# Patient Record
Sex: FEMALE | Race: WHITE | NOT HISPANIC OR LATINO | URBAN - METROPOLITAN AREA
[De-identification: names, ages, dates, MRNs, and addresses within clinical notes are randomized per-mention and may not be internally consistent; named-entity substitution may affect disease eponyms.]

---

## 2017-03-30 ENCOUNTER — INPATIENT (INPATIENT)
Facility: HOSPITAL | Age: 37
LOS: 1 days | Discharge: HOME | End: 2017-04-01
Attending: OBSTETRICS & GYNECOLOGY | Admitting: OBSTETRICS & GYNECOLOGY

## 2017-06-27 DIAGNOSIS — O09.43 SUPERVISION OF PREGNANCY WITH GRAND MULTIPARITY, THIRD TRIMESTER: ICD-10-CM

## 2017-06-27 DIAGNOSIS — Z3A.38 38 WEEKS GESTATION OF PREGNANCY: ICD-10-CM

## 2017-06-27 DIAGNOSIS — Z33.1 PREGNANT STATE, INCIDENTAL: ICD-10-CM

## 2019-02-04 ENCOUNTER — INPATIENT (INPATIENT)
Facility: HOSPITAL | Age: 39
LOS: 0 days | Discharge: HOME | End: 2019-02-05
Attending: OBSTETRICS & GYNECOLOGY | Admitting: OBSTETRICS & GYNECOLOGY
Payer: MEDICAID

## 2019-02-04 VITALS
OXYGEN SATURATION: 99 % | SYSTOLIC BLOOD PRESSURE: 120 MMHG | RESPIRATION RATE: 18 BRPM | DIASTOLIC BLOOD PRESSURE: 60 MMHG | TEMPERATURE: 98 F | HEART RATE: 125 BPM

## 2019-02-04 LAB
ALBUMIN SERPL ELPH-MCNC: 3.7 G/DL — SIGNIFICANT CHANGE UP (ref 3.5–5.2)
ALP SERPL-CCNC: 93 U/L — SIGNIFICANT CHANGE UP (ref 30–115)
ALT FLD-CCNC: 15 U/L — SIGNIFICANT CHANGE UP (ref 0–41)
ANION GAP SERPL CALC-SCNC: 21 MMOL/L — HIGH (ref 7–14)
APPEARANCE UR: ABNORMAL
AST SERPL-CCNC: 24 U/L — SIGNIFICANT CHANGE UP (ref 0–41)
BASE EXCESS BLDV CALC-SCNC: -1.7 MMOL/L — SIGNIFICANT CHANGE UP (ref -2–2)
BASOPHILS # BLD AUTO: 0.01 K/UL — SIGNIFICANT CHANGE UP (ref 0–0.2)
BASOPHILS NFR BLD AUTO: 0.1 % — SIGNIFICANT CHANGE UP (ref 0–1)
BILIRUB DIRECT SERPL-MCNC: <0.2 MG/DL — SIGNIFICANT CHANGE UP (ref 0–0.2)
BILIRUB INDIRECT FLD-MCNC: >0.1 MG/DL — LOW (ref 0.2–1.2)
BILIRUB SERPL-MCNC: 0.3 MG/DL — SIGNIFICANT CHANGE UP (ref 0.2–1.2)
BILIRUB UR-MCNC: NEGATIVE — SIGNIFICANT CHANGE UP
BUN SERPL-MCNC: 4 MG/DL — LOW (ref 10–20)
CA-I SERPL-SCNC: 1.27 MMOL/L — SIGNIFICANT CHANGE UP (ref 1.12–1.3)
CALCIUM SERPL-MCNC: 10 MG/DL — SIGNIFICANT CHANGE UP (ref 8.5–10.1)
CHLORIDE SERPL-SCNC: 98 MMOL/L — SIGNIFICANT CHANGE UP (ref 98–110)
CO2 SERPL-SCNC: 21 MMOL/L — SIGNIFICANT CHANGE UP (ref 17–32)
COLOR SPEC: YELLOW — SIGNIFICANT CHANGE UP
CREAT SERPL-MCNC: <0.5 MG/DL — LOW (ref 0.7–1.5)
DIFF PNL FLD: NEGATIVE — SIGNIFICANT CHANGE UP
EOSINOPHIL # BLD AUTO: 0.01 K/UL — SIGNIFICANT CHANGE UP (ref 0–0.7)
EOSINOPHIL NFR BLD AUTO: 0.1 % — SIGNIFICANT CHANGE UP (ref 0–8)
EPI CELLS # UR: ABNORMAL /HPF
FLU A RESULT: POSITIVE
FLU A RESULT: POSITIVE
FLUAV AG NPH QL: POSITIVE
FLUBV AG NPH QL: NEGATIVE — SIGNIFICANT CHANGE UP
GAS PNL BLDV: 138 MMOL/L — SIGNIFICANT CHANGE UP (ref 136–145)
GAS PNL BLDV: SIGNIFICANT CHANGE UP
GLUCOSE SERPL-MCNC: 80 MG/DL — SIGNIFICANT CHANGE UP (ref 70–99)
GLUCOSE UR QL: NEGATIVE MG/DL — SIGNIFICANT CHANGE UP
HCO3 BLDV-SCNC: 23 MMOL/L — SIGNIFICANT CHANGE UP (ref 22–29)
HCT VFR BLD CALC: 36.5 % — LOW (ref 37–47)
HCT VFR BLDA CALC: 39.8 % — SIGNIFICANT CHANGE UP (ref 34–44)
HGB BLD CALC-MCNC: 13 G/DL — LOW (ref 14–18)
HGB BLD-MCNC: 12 G/DL — SIGNIFICANT CHANGE UP (ref 12–16)
IMM GRANULOCYTES NFR BLD AUTO: 1.4 % — HIGH (ref 0.1–0.3)
KETONES UR-MCNC: 40
LACTATE BLDV-MCNC: 2.4 MMOL/L — HIGH (ref 0.5–1.6)
LEUKOCYTE ESTERASE UR-ACNC: ABNORMAL
LIDOCAIN IGE QN: 21 U/L — SIGNIFICANT CHANGE UP (ref 7–60)
LYMPHOCYTES # BLD AUTO: 0.43 K/UL — LOW (ref 1.2–3.4)
LYMPHOCYTES # BLD AUTO: 4.9 % — LOW (ref 20.5–51.1)
MCHC RBC-ENTMCNC: 28.4 PG — SIGNIFICANT CHANGE UP (ref 27–31)
MCHC RBC-ENTMCNC: 32.9 G/DL — SIGNIFICANT CHANGE UP (ref 32–37)
MCV RBC AUTO: 86.5 FL — SIGNIFICANT CHANGE UP (ref 81–99)
MONOCYTES # BLD AUTO: 0.88 K/UL — HIGH (ref 0.1–0.6)
MONOCYTES NFR BLD AUTO: 10 % — HIGH (ref 1.7–9.3)
NEUTROPHILS # BLD AUTO: 7.38 K/UL — HIGH (ref 1.4–6.5)
NEUTROPHILS NFR BLD AUTO: 83.5 % — HIGH (ref 42.2–75.2)
NITRITE UR-MCNC: NEGATIVE — SIGNIFICANT CHANGE UP
NRBC # BLD: 0 /100 WBCS — SIGNIFICANT CHANGE UP (ref 0–0)
PCO2 BLDV: 40 MMHG — LOW (ref 41–51)
PH BLDV: 7.37 — SIGNIFICANT CHANGE UP (ref 7.26–7.43)
PH UR: 6.5 — SIGNIFICANT CHANGE UP (ref 5–8)
PLATELET # BLD AUTO: 193 K/UL — SIGNIFICANT CHANGE UP (ref 130–400)
PO2 BLDV: 29 MMHG — SIGNIFICANT CHANGE UP (ref 20–40)
POTASSIUM BLDV-SCNC: 3.5 MMOL/L — SIGNIFICANT CHANGE UP (ref 3.3–5.6)
POTASSIUM SERPL-MCNC: 3.7 MMOL/L — SIGNIFICANT CHANGE UP (ref 3.5–5)
POTASSIUM SERPL-SCNC: 3.7 MMOL/L — SIGNIFICANT CHANGE UP (ref 3.5–5)
PROT SERPL-MCNC: 6.4 G/DL — SIGNIFICANT CHANGE UP (ref 6–8)
PROT UR-MCNC: NEGATIVE MG/DL — SIGNIFICANT CHANGE UP
RBC # BLD: 4.22 M/UL — SIGNIFICANT CHANGE UP (ref 4.2–5.4)
RBC # FLD: 14 % — SIGNIFICANT CHANGE UP (ref 11.5–14.5)
RSV RESULT: NEGATIVE — SIGNIFICANT CHANGE UP
RSV RNA RESP QL NAA+PROBE: NEGATIVE — SIGNIFICANT CHANGE UP
SAO2 % BLDV: 49 % — SIGNIFICANT CHANGE UP
SODIUM SERPL-SCNC: 140 MMOL/L — SIGNIFICANT CHANGE UP (ref 135–146)
SP GR SPEC: <=1.005 — SIGNIFICANT CHANGE UP (ref 1.01–1.03)
TROPONIN T SERPL-MCNC: <0.01 NG/ML — SIGNIFICANT CHANGE UP
UROBILINOGEN FLD QL: 0.2 MG/DL — SIGNIFICANT CHANGE UP (ref 0.2–0.2)
WBC # BLD: 8.83 K/UL — SIGNIFICANT CHANGE UP (ref 4.8–10.8)
WBC # FLD AUTO: 8.83 K/UL — SIGNIFICANT CHANGE UP (ref 4.8–10.8)
WBC UR QL: ABNORMAL /HPF

## 2019-02-04 RX ORDER — SODIUM CHLORIDE 9 MG/ML
1000 INJECTION INTRAMUSCULAR; INTRAVENOUS; SUBCUTANEOUS ONCE
Qty: 0 | Refills: 0 | Status: COMPLETED | OUTPATIENT
Start: 2019-02-04 | End: 2019-02-04

## 2019-02-04 RX ORDER — ACETAMINOPHEN 500 MG
650 TABLET ORAL ONCE
Qty: 0 | Refills: 0 | Status: COMPLETED | OUTPATIENT
Start: 2019-02-04 | End: 2019-02-04

## 2019-02-04 RX ORDER — SODIUM CHLORIDE 9 MG/ML
1000 INJECTION, SOLUTION INTRAVENOUS ONCE
Qty: 0 | Refills: 0 | Status: COMPLETED | OUTPATIENT
Start: 2019-02-04 | End: 2019-02-04

## 2019-02-04 RX ADMIN — SODIUM CHLORIDE 2000 MILLILITER(S): 9 INJECTION INTRAMUSCULAR; INTRAVENOUS; SUBCUTANEOUS at 20:20

## 2019-02-04 RX ADMIN — SODIUM CHLORIDE 1000 MILLILITER(S): 9 INJECTION, SOLUTION INTRAVENOUS at 21:45

## 2019-02-04 RX ADMIN — Medication 650 MILLIGRAM(S): at 21:45

## 2019-02-04 RX ADMIN — Medication 75 MILLIGRAM(S): at 22:11

## 2019-02-04 RX ADMIN — SODIUM CHLORIDE 2000 MILLILITER(S): 9 INJECTION INTRAMUSCULAR; INTRAVENOUS; SUBCUTANEOUS at 23:20

## 2019-02-04 NOTE — ED ADULT NURSE NOTE - OBJECTIVE STATEMENT
Patient complaining of weakness and near syncopal episodes. Patient denies any loc. Denies N/V/D or cough at this time. Patient is 34 weeks pregnant with no PMH.

## 2019-02-04 NOTE — OB PROVIDER H&P - NSHPLABSRESULTS_GEN_ALL_CORE
8.83>12.0/36.5<193, 140/3.7/98/21/4/0.5<133, AST/ALT 24/15, lipase 21, blood gas values wnl except blood gas venous lactate 2.4, total hemoglobin 13.0, pCO2 venous 40, UA 40 ketone, moderate leuk esterase, 10-25 WBCs, few epithelial cells, Flu A pos, Flu B and RSV neg    EKG FINDINGS:    Ventricular Rate 136 BPM  Atrial Rate 136 BPM  P-R Interval 130 ms  QRS Duration 66 ms  Q-T Interval 290 ms  QTC Calculation(Bezet) 436 ms  P Axis 69 degrees  R Axis 62 degrees  T Axis 35 degrees  Diagnosis: Line Sinus tachycardia  Possible Left atrial enlargement  Nonspecific T wave abnormality  Abnormal ECG

## 2019-02-04 NOTE — OB PROVIDER H&P - NSHPPHYSICALEXAM_GEN_ALL_CORE
Vital Signs Last 24 Hrs  T(C): 38.2 (04 Feb 2019 21:15), Max: 38.2 (04 Feb 2019 21:15)  T(F): 100.8 (04 Feb 2019 21:15), Max: 100.8 (04 Feb 2019 21:15)  HR: 134 (04 Feb 2019 21:15) (104 - 134)  BP: 113/63 (04 Feb 2019 21:15) (113/59 - 120/60)  RR: 18 (04 Feb 2019 21:15) (18 - 18)  SpO2: 99% (04 Feb 2019 21:15) (99% - 99%)    Gen: AAOx3  Hearts: regular rhythm, tachycardic with manual HR count of 138 bpm, no murmurs or extra heart sounds  Lungs: clear to auscultation  Abd: NT, gravid, no palpable contractions Vital Signs Last 24 Hrs  T(C): 38.2 (04 Feb 2019 21:15), Max: 38.2 (04 Feb 2019 21:15)  T(F): 100.8 (04 Feb 2019 21:15), Max: 100.8 (04 Feb 2019 21:15)  HR: 134 (04 Feb 2019 21:15) (104 - 134)  BP: 113/63 (04 Feb 2019 21:15) (113/59 - 120/60)  RR: 18 (04 Feb 2019 21:15) (18 - 18)  SpO2: 99% (04 Feb 2019 21:15) (99% - 99%)    Gen: AAOx3  Hearts: regular rhythm, tachycardic with manual HR count of 138 bpm, no murmurs or extra heart sounds  Lungs: clear to auscultation  Abd: NT, gravid, no palpable contractions  Bedside sono: fundal placenta, cephalic, MVP 4.80 cm, BPP 8/8,  bpm

## 2019-02-04 NOTE — OB PROVIDER H&P - NS_OBGYNHISTORY_OBGYN_ALL_OB_FT
OB Hx: FT  x6, no complications  GYN Hx: denies h/o fibroids, abnormal paps, ovarian cysts and STIs OB Hx: FT  x6, largest 8-5lb, no complications  GYN Hx: denies h/o fibroids, abnormal paps, ovarian cysts and STIs

## 2019-02-04 NOTE — ED PROVIDER NOTE - MEDICAL DECISION MAKING DETAILS
Pt reported febrile when pt took temperature in room. Flu a positive. Remains tachycardic after 1L IVF. Julian continue to bolus and repeat lactate. Give tylenol for fever. Discussed with OBGYN and plan for admission to L+D and tamiflu. Pt voiced understanding and agrees with plan.

## 2019-02-04 NOTE — ED PROVIDER NOTE - PHYSICAL EXAMINATION
CONSTITUTIONAL: well developed, nontoxic appearing, in no acute distress, speaking in full sentences  SKIN: warm, dry, no rash, cap refill < 2 seconds  HEENT: normocephalic, atraumatic, no conjunctival erythema, moist mucous membranes, patent airway  NECK: supple, no masses  CV:  tachycardic rate, regular rhythm, 2+ radial pulses bilaterally  RESP: no wheezes, no rales, no rhonchi, normal work of breathing  BACK: no CVA tenderness  ABD: soft, nontender, nondistended, no rebound, no guarding, normoactive bowel sounds, gravid uterus  MSK: normal ROM, no cyanosis, no edema  NEURO: alert, oriented, grossly unremarkable  PSYCH: cooperative, appropriate

## 2019-02-04 NOTE — OB PROVIDER H&P - HISTORY OF PRESENT ILLNESS
40 yo  at 33w5d w/ JAMES of 3/20/2019 by here for fever/chills and dizziness/fainting spells associated with SOB and palpitations that started when she woke up at 0900. Pt reported not even feeling able to get out of her bed by 1500, and coming to the ER with an ambulance. Pt reports her daughter having the flu recently but just getting better. Also reports having "a couple drops of diarrhea" every time she goes to urinate. No other separate episodes, nonbloody. Pt additionally reports having heartburn-like pain after eating, but cannot be sure if that is what it is. She reports that she is having burning pain at her epigastric area since she ate last. Denies N/V, dysuria, frequency and urgency, abdominal vaginal discharge and recent travel. Denies contractions, LOF and vaginal bleeding. Feels good FM. No complications in this pregnancy. Last PMD visit was 38 yo  at 33w5d w/ JAMES of 3/20/2019 by 1st trimester sonogram here for fever/chills (100.5 F) and dizziness/fainting spells associated with SOB and palpitations that started when she woke up at 0900. Pt reports not even feeling able to get out of her bed by 1500, and coming to the ER with an ambulance. Pt reports her daughter having the flu recently but just recently getting better. Also reports having "a couple drops of diarrhea" every time she goes to urinate. No other separate episodes, nonbloody. Pt additionally reports having heartburn pain after eating. She reports having similar pain in previous pregnancies and postpartum periods, and also during this pregnancy before. Pt seems mostly concerned about the dizziness/fainting spells as these episodes also happened two months ago for a week long period. She was evaluated by her OB but further referred to her primary care doctor. Pt never went to see her primary care doctor as the episodes subsided. Denies N/V, dysuria, frequency and urgency, abdominal vaginal discharge and recent travel. Denies contractions, LOF and vaginal bleeding. Feels good FM. Last BM was a small amount of diarrhea 15 minutes ago, last PO intake was at 2100, last intercourse was a week and a half ago. No complications in this pregnancy. Last PMD visit was around a month ago and pt never had a VE.

## 2019-02-04 NOTE — ED PROVIDER NOTE - PROGRESS NOTE DETAILS
Sepsis suspected at this time. Sepsis suspected at this time. ABX held for viral illness Spoke with OBGYN who will see pt. Call placed for Dr. Harper. Spoke with Dr. Ellison covering for Dr. Harper. Recommended admission and tamiflu.  pregnant 34w presenting with flu-like sx. Sepsis suspected at this time. ABX held for viral illness. Bedside US shows . Cardiac workup neg, do not suspect PE at this time. Influenza A positive. UA only with +LE and 15-20 WBC but no nitrite, abx not indicated at this time. Spoke with OBGYN who will see pt. Call placed for Dr. Harper. Pt requesting to hold off on tamiflu until cleared by Dr. Harper. Spoke with Dr. Ellison covering for Dr. Harper. Recommended admission and tamiflu. Updated pt and  at bedside. Pt amenable to tamiflu.

## 2019-02-04 NOTE — ED PROVIDER NOTE - NS ED ROS FT
GEN:  + fever, + chills, + fatigue  NEURO:  no headache, + dizziness  ENT: no sore throat, no runny nose  CV:  + chest pain, no SOB, + palpitations  RESP:  no dyspnea, no cough  GI:  no nausea, no vomiting, no abdominal pain, + diarrhea, no constipation  :  no dysuria, no urinary frequency, no hematuria  MSK:  no joint pain, no edema  SKIN:  no rash, no bruising  HEME: no hematochezia, no melena

## 2019-02-04 NOTE — ED PROVIDER NOTE - OBJECTIVE STATEMENT
38 yo F  34 wks by US who presents with fever Tmax 100.5 and near-syncopal events since this morning. Pt felt well going to bed last night but this morning woke up with generalized weakness, lightheadedness feeling like she's about to pass out, palpitations. Also had intermittent nonradiating midsternal cp that worsened after eating, felt similar to prior heartburn she's had in the past, pain not worse with deep breaths. Had small amounts of nonbloody diarrhea. No nausea, vomiting, abd pain/cramping, cough, vaginal bleeding/dc, gush of fluids, leg swelling/pain. +fetal movement. Had UTI 2 wks ago that was tx'ed with bactrim, currently denies any urinary sx. Child at home tested + for flu. Pt did not receive flu shot this year. No hx of complications during pregnancy.    OBGYN: Dr. Harper

## 2019-02-04 NOTE — ED PROVIDER NOTE - ATTENDING CONTRIBUTION TO CARE
39F presentingw ith 1 day of near syncope which is followed by palpiations, nausea and 39F  34 wks by US who with 1 day of near syncope which is followed by palpitations, nausea. Endorses fever and sick contact (daughter with flu). Has recent UTI which resolved with ABX. No back pain, vomiting, vaginal bleeding, abdominal pain or cramping. No vaginal discharge.   VITAL SIGNS: noted  CONSTITUTIONAL: Well-developed; well-nourished; ill appearing. non toxic  HEAD: Normocephalic; atraumatic  EYES: conjunctiva and sclera clear  ENT: No nasal discharge; airway clear. MMM  NECK: Supple; non tender. No anterior cervical lymphadenopathy noted  CARD: regular, tachycardic  RESP: CTAB/L, no wheezes, rales or rhonchi  ABD: Normal bowel sounds; firm, appropriately distended, nt. no cvat bl  EXT: Normal ROM. No calf tenderness or edema. Distal pulses intact  NEURO: Alert, oriented. Grossly unremarkable. No focal deficits  SKIN: Skin exam is warm and dry, no acute rash  MS: No midline spinal tenderness    Fever and near syncope concern for infection initially sinus tachycardic to 135 on ekg. WIll treat as sepsis, abx deferred for likley viral syndrome.

## 2019-02-05 ENCOUNTER — TRANSCRIPTION ENCOUNTER (OUTPATIENT)
Age: 39
End: 2019-02-05

## 2019-02-05 VITALS
RESPIRATION RATE: 18 BRPM | HEART RATE: 90 BPM | DIASTOLIC BLOOD PRESSURE: 49 MMHG | OXYGEN SATURATION: 100 % | SYSTOLIC BLOOD PRESSURE: 94 MMHG

## 2019-02-05 LAB
ANION GAP SERPL CALC-SCNC: 15 MMOL/L — HIGH (ref 7–14)
BUN SERPL-MCNC: 3 MG/DL — LOW (ref 10–20)
CALCIUM SERPL-MCNC: 8.1 MG/DL — LOW (ref 8.5–10.1)
CHLORIDE SERPL-SCNC: 103 MMOL/L — SIGNIFICANT CHANGE UP (ref 98–110)
CO2 SERPL-SCNC: 22 MMOL/L — SIGNIFICANT CHANGE UP (ref 17–32)
CREAT SERPL-MCNC: <0.5 MG/DL — LOW (ref 0.7–1.5)
CULTURE RESULTS: SIGNIFICANT CHANGE UP
GLUCOSE SERPL-MCNC: 96 MG/DL — SIGNIFICANT CHANGE UP (ref 70–99)
HCT VFR BLD CALC: 29.9 % — LOW (ref 37–47)
HGB BLD-MCNC: 9.6 G/DL — LOW (ref 12–16)
MCHC RBC-ENTMCNC: 28.2 PG — SIGNIFICANT CHANGE UP (ref 27–31)
MCHC RBC-ENTMCNC: 32.1 G/DL — SIGNIFICANT CHANGE UP (ref 32–37)
MCV RBC AUTO: 87.9 FL — SIGNIFICANT CHANGE UP (ref 81–99)
NRBC # BLD: 0 /100 WBCS — SIGNIFICANT CHANGE UP (ref 0–0)
PLATELET # BLD AUTO: 160 K/UL — SIGNIFICANT CHANGE UP (ref 130–400)
POTASSIUM SERPL-MCNC: 3.8 MMOL/L — SIGNIFICANT CHANGE UP (ref 3.5–5)
POTASSIUM SERPL-SCNC: 3.8 MMOL/L — SIGNIFICANT CHANGE UP (ref 3.5–5)
RBC # BLD: 3.4 M/UL — LOW (ref 4.2–5.4)
RBC # FLD: 14 % — SIGNIFICANT CHANGE UP (ref 11.5–14.5)
SODIUM SERPL-SCNC: 140 MMOL/L — SIGNIFICANT CHANGE UP (ref 135–146)
SPECIMEN SOURCE: SIGNIFICANT CHANGE UP
WBC # BLD: 5.84 K/UL — SIGNIFICANT CHANGE UP (ref 4.8–10.8)
WBC # FLD AUTO: 5.84 K/UL — SIGNIFICANT CHANGE UP (ref 4.8–10.8)

## 2019-02-05 PROCEDURE — 99282 EMERGENCY DEPT VISIT SF MDM: CPT | Mod: 25

## 2019-02-05 PROCEDURE — 93970 EXTREMITY STUDY: CPT | Mod: 26

## 2019-02-05 PROCEDURE — 59025 FETAL NON-STRESS TEST: CPT | Mod: 26

## 2019-02-05 RX ORDER — ACETAMINOPHEN 500 MG
2 TABLET ORAL
Qty: 112 | Refills: 0 | OUTPATIENT
Start: 2019-02-05 | End: 2019-02-18

## 2019-02-05 RX ORDER — ACETAMINOPHEN 500 MG
650 TABLET ORAL EVERY 6 HOURS
Qty: 0 | Refills: 0 | Status: DISCONTINUED | OUTPATIENT
Start: 2019-02-05 | End: 2019-02-05

## 2019-02-05 RX ORDER — IPRATROPIUM/ALBUTEROL SULFATE 18-103MCG
3 AEROSOL WITH ADAPTER (GRAM) INHALATION EVERY 4 HOURS
Qty: 0 | Refills: 0 | Status: DISCONTINUED | OUTPATIENT
Start: 2019-02-05 | End: 2019-02-05

## 2019-02-05 RX ORDER — SODIUM CHLORIDE 9 MG/ML
1000 INJECTION, SOLUTION INTRAVENOUS
Qty: 0 | Refills: 0 | Status: DISCONTINUED | OUTPATIENT
Start: 2019-02-05 | End: 2019-02-05

## 2019-02-05 RX ADMIN — SODIUM CHLORIDE 125 MILLILITER(S): 9 INJECTION, SOLUTION INTRAVENOUS at 03:17

## 2019-02-05 RX ADMIN — Medication 75 MILLIGRAM(S): at 18:34

## 2019-02-05 RX ADMIN — SODIUM CHLORIDE 125 MILLILITER(S): 9 INJECTION, SOLUTION INTRAVENOUS at 10:36

## 2019-02-05 RX ADMIN — Medication 75 MILLIGRAM(S): at 05:46

## 2019-02-05 RX ADMIN — Medication 650 MILLIGRAM(S): at 08:52

## 2019-02-05 NOTE — DISCHARGE NOTE OB - PATIENT PORTAL LINK FT
You can access the [facility] Patient Portal, offered by [facility], by registering with the following website: [email or web address]

## 2019-02-05 NOTE — CONSULT NOTE ADULT - CONSULT REASON
40 y/o now 33 weeks pregnant with influenza. H/o "fainting spells" 6 weeks ago every other day. Today fainting spells came back, associated with palpitations, sweating, head feeling heavy.

## 2019-02-05 NOTE — PROGRESS NOTE ADULT - SUBJECTIVE AND OBJECTIVE BOX
PGY 4 Note    Pt evaluated at bedside s/p cardiology consultation (documented normal LV function, positive flu, sinus tachy) can be discharged home. Patient denies CP, SOB, nausea, vomiting, diarrhea constipation. Reports flu like symptoms have significantly improved since admission. Denies palpitations or dizzy spells at this time. Reports good fetal movement; denies VB, LOF or contractions. Per PMD, can be discharged home    T(F): 97.3 (20:31)  HR: 93 (20:31)  BP: 97/53 (20:31)  RR: 18 (20:31)    Labs:                        9.6    5.84  )-----------( 160      ( 05 Feb 2019 07:34 )             29.9       02-05    140  |  103  |  3<L>  ----------------------------<  96  3.8   |  22  |  <0.5<L>    Ca    8.1<L>      05 Feb 2019 07:34    TPro  6.4  /  Alb  3.7  /  TBili  0.3  /  DBili  <0.2  /  AST  24  /  ALT  15  /  AlkPhos  93  02-04      Urinalysis Basic - ( 04 Feb 2019 20:00 )    Color: Yellow / Appearance: Cloudy / SG: <=1.005 / pH: x  Gluc: x / Ketone: 40  / Bili: Negative / Urobili: 0.2 mg/dL   Blood: x / Protein: Negative mg/dL / Nitrite: Negative   Leuk Esterase: Moderate / RBC: x / WBC 10-25 /HPF   Sq Epi: x / Non Sq Epi: Few /HPF / Bacteria: x      Meds:   (Floorstock) 2 each &lt;see task&gt; GivenOnce  acetaminophen   Tablet .. 650 milliGRAM(s) Oral once  lactated ringers Bolus 1000 milliLiter(s) IV Bolus once  sodium chloride 0.9% Bolus 1000 milliLiter(s) IV Bolus once  sodium chloride 0.9% Bolus 1000 milliLiter(s) IV Bolus once

## 2019-02-05 NOTE — DISCHARGE NOTE OB - CARE PROVIDER_API CALL
Brett Harper)  Obstetrics and Gynecology  [address] [place] [address]  Phone: [phone]  Fax: [phone]  Follow Up Time:

## 2019-02-05 NOTE — CONSULT NOTE ADULT - ASSESSMENT
40 yo  at 33w6d w/ JAMES of 3/20/2019 by 1st trimester sonogram, Flu A positive, with complaints of pre-syncope r/o cardiac etiology    - Admitted to [facility] floor  - IV fluids  - NST BID  - F/u Cardiology consult  - F/u TTE (not done yet)  - Repeat AM labs (CBC, BMP)  - F/u BCx, UCx  - Consider orthostatic BPs  - Consider LE duplex  - F/u Temps  - Tylenol PRN  - Regular diet  - Ambulate as tolerated  - SCDs while in bed  - Cont tamiflu, nebs PRN    Will follow    Dr. Jara to be made aware

## 2019-02-05 NOTE — PROGRESS NOTE ADULT - SUBJECTIVE AND OBJECTIVE BOX
Attending  Antepartum Note    Patient seen at bedside. Denies ctx, vb, lof. Good FM. No complaints.  No further syncopal episodes, no sob, no chest pain.     T(F): 98.3 (12:45)  HR: 105 (12:45)  BP: 93/50 (12:45)  RR: 18 (12:45)  BP range:  Gen: NAD, A&Ox3  Abd: Gravid, soft, NT, no palpable ctx  VE: Deferred  Ext: No edema or calf tenderness    Tracing: in progress moderate variability , Category 1     Medications:  (Floorstock): 2 Each (02-04-19 @ 21:53)  acetaminophen   Tablet ..: 650 milliGRAM(s) (02-04-19 @ 21:45)  lactated ringers Bolus: 1000 mL/Hr (02-04-19 @ 21:45)  lactated ringers.: 125 mL/Hr (02-05-19 @ 03:17),  125 mL/Hr (02-05-19 @ 00:54)  oseltamivir: 75 milliGRAM(s) (02-05-19 @ 05:46),  75 milliGRAM(s) (02-04-19 @ 22:11)  sodium chloride 0.9% Bolus: 2000 mL/Hr (02-04-19 @ 20:20)  sodium chloride 0.9% Bolus: 2000 mL/Hr (02-04-19 @ 23:20)      Labs:                        9.6    5.84  )-----------( 160      ( 05 Feb 2019 07:34 )             29.9     02-05    140  |  103  |  3<L>  ----------------------------<  96  3.8   |  22  |  <0.5<L>    Ca    8.1<L>      05 Feb 2019 07:34    TPro  6.4  /  Alb  3.7  /  TBili  0.3  /  DBili  <0.2  /  AST  24  /  ALT  15  /  AlkPhos  93  02-04      Urinalysis Basic - ( 04 Feb 2019 20:00 )    Color: Yellow / Appearance: Cloudy / SG: <=1.005 / pH: x  Gluc: x / Ketone: 40  / Bili: Negative / Urobili: 0.2 mg/dL   Blood: x / Protein: Negative mg/dL / Nitrite: Negative   Leuk Esterase: Moderate / RBC: x / WBC 10-25 /HPF   Sq Epi: x / Non Sq Epi: Few /HPF / Bacteria: x    Chest X ray  Cardiac/mediastinum/hilum: Unremarkable.  Lung parenchyma/Pleura: Left basilar linear atelectasis. No defined   consolidation or pneumothorax..  Skeleton/soft tissues: Unremarkable.  Impression:    Left basilar linear atelectasis    Transthoracic Echocardiogram   Summary:   1. Mildly enlarged left atrium.   2. Mild tricuspid regurgitation.   3. Pulmonic valve regurgitation.   4. Estimated pulmonary artery systolic pressure is 37.3 mmHg assuming a   right atrial pressure of 15 mmHg, which is consistent with borderline   pulmonary hypertension.   5. LA volume Index is 34.3 ml/m² ml/m2.     12 Lead ECG   Diagnosis Line Sinus tachycardia  Possible Left atrial enlargement  Nonspecific T wave abnormality          Venous Duplex pending    A/P:   39y at 33wk 5d with + flu A, admitted w/ dehydration and presyncopal episode  and tachycardia and non specific ekg findings. , in midst of cardiac work-up.   -Continue Tamiflu 75 Bid   -will lock iv as per pateint requst as long as po fluids tolerated  -Nst bid  -await cardiology consult  -f/u dupplex doppler results   -check pending labs

## 2019-02-05 NOTE — DISCHARGE NOTE OB - HOSPITAL COURSE
DATE OF DISCHARGE: 19 @ 21:49    HISTORY OF PRESENT ILLNESS/HOSPITAL COURSE: HPI:  38 yo  at 33w5d w/ JAMES of 3/20/2019 by 1st trimester sonogram here for fever/chills (100.5 F) and dizziness/fainting spells associated with SOB and palpitations that started when she woke up at 0900. Pt reports not even feeling able to get out of her bed by 1500, and coming to the ER with an ambulance. Pt reports her daughter having the flu recently but just recently getting better. Also reports having "a couple drops of diarrhea" every time she goes to urinate. No other separate episodes, nonbloody. Pt additionally reports having heartburn pain after eating. She reports having similar pain in previous pregnancies and postpartum periods, and also during this pregnancy before. Pt seems mostly concerned about the dizziness/fainting spells as these episodes also happened two months ago for a week long period. She was evaluated by her OB but further referred to her primary care doctor. Pt never went to see her primary care doctor as the episodes subsided. Denies N/V, dysuria, frequency and urgency, abdominal vaginal discharge and recent travel. Denies contractions, LOF and vaginal bleeding. Feels good FM. Last BM was a small amount of diarrhea 15 minutes ago, last PO intake was at 2100, last intercourse was a week and a half ago. No complications in this pregnancy. Last PMD visit was around a month ago and pt never had a VE. (2019 23:39)    PAST MEDICAL & SURGICAL HISTORY:  No pertinent past medical history  No significant past surgical history      ANTEPARTUM COURSE: patient was admitted in house fo symptomatic management. She was started on tamiflu and tylenol as well as IV hydration. She underwent an ECHO that was read and confirmed by cardio to be unremarkable. She was discharged home for follow up in the antepartum state.

## 2019-02-05 NOTE — CONSULT NOTE ADULT - SUBJECTIVE AND OBJECTIVE BOX
Patient is a 39y old  Female who presents with a chief complaint of "fainting spells"    HPI:  38 yo  at 33w6d w/ JAMES of 3/20/2019 by 1st trimester sonogram here for fever/chills (100.5 F) and dizziness/"fainting spells" associated with SOB, CP and palpitations that started when she woke up yesterday at 0900. The dizzy "fainting spells" feels like her heart is pounding, with associated blackened vision, CP and SOB. Does not lose consciousness and has not fallen. No auditory symptoms or headaches. Pt reports not even feeling able to get out of her bed by 1500, and coming to the ER by ambulance. Pt reports her daughter having the flu recently but just recently getting better. Also reports having small amounts of watery nonbloody stool every time she goes to urinate, last at 0400 this morning. This also happened two months ago for a week long period. She was evaluated by her OB but further referred to her primary care doctor who she never went to see as the episodes resolved. Also has chills, body aches. Denies N/V, dysuria, frequency and urgency, abnormal vaginal discharge or recent travel. Denies contractions, LOF and vaginal bleeding. Feels good FM. Last PO intake was at midnight, last intercourse was a week and a half ago. No complications in this pregnancy.     OB: FT  x 6, largest baby >8lb, uncomplicated  GYN: Denies abnormal paps, fibroids, cysts, STIs    PAST MEDICAL & SURGICAL HISTORY:  No pertinent past medical history  No significant past surgical history      MEDICATIONS  (STANDING):  lactated ringers. 1000 milliLiter(s) (125 mL/Hr) IV Continuous <Continuous>  oseltamivir 75 milliGRAM(s) Oral two times a day      FAMILY HISTORY:  No pertinent family history in first degree relatives      Allergies    No Known Allergies    Intolerances        REVIEW OF SYSTEMS    Neg except for in HPI    Vital Signs Last 24 Hrs  T(C): 37.1 (2019 05:46), Max: 38.2 (2019 21:15)  T(F): 98.7 (2019 05:46), Max: 100.8 (2019 21:15)  HR: 109 (2019 05:46) (104 - 134)  BP: 100/52 (2019 05:46) (100/52 - 120/60)  RR: 18 (2019 05:46) (18 - 18)  SpO2: 100% (2019 05:46) (99% - 100%) on RA    PHYSICAL EXAM:    Gen: NAD, A&Ox3  Heart: S1S2, RRR  Lungs: CTAB  Abd: Gravid, soft, NT, no palpable ctx  SVE: Deferred  Ext: No edema or calf tenderness      EFM: /mod/+accels  [place]: Q m        LABORATORY:                        12.0   8.83  )-----------( 193      ( 2019 19:24 )             36.5     02-    140  |  98  |  4<L>  ----------------------------<  80  3.7   |  21  |  <0.5<L>    Ca    10.0      2019 19:24    TPro  6.4  /  Alb  3.7  /  TBili  0.3  /  DBili  <0.2  /  AST  24  /  ALT  15  /  AlkPhos  93  02-04      Urinalysis Basic - ( 2019 20:00 )    Color: Yellow / Appearance: Cloudy / SG: <=1.005 / pH: x  Gluc: x / Ketone: 40  / Bili: Negative / Urobili: 0.2 mg/dL   Blood: x / Protein: Negative mg/dL / Nitrite: Negative   Leuk Esterase: Moderate / RBC: x / WBC 10-25 /HPF   Sq Epi: x / Non Sq Epi: Few /HPF / Bacteria: x    Troponin T, Serum: <0.01 ng/mL (19 @ 19:24)    FLU A B RSV Detection by PCR (19 @ 19:24)    Flu A Result: Positive    Flu B Result: Negative    RSV Result: Negative      Imaging:    EKG Sinus Tachycardia HR 136bpm, left atrial enlargement, nonspecific T wave abnormality    Bedside OB sonogram: Cephalic, fundal placenta, MVP 4.8cm, BPP 8/8    CXR done, read pending

## 2019-02-05 NOTE — CONSULT NOTE ADULT - SUBJECTIVE AND OBJECTIVE BOX
Date of Admission: 19    CHIEF COMPLAINT: Fainting spells    HISTORY OF PRESENT ILLNESS:  38 yo  at 33w5d w/ JAMES of 3/20/2019 by 1st trimester sonogram here for fever/chills (100.5 F) and dizziness/fainting spells associated with SOB and palpitations that started when she woke up at 0900. Pt reports not even feeling able to get out of her bed by 1500, and coming to the ER with an ambulance. Pt reports her daughter having the flu recently but just recently getting better. Also reports having "a couple drops of diarrhea" every time she goes to urinate. No other separate episodes, nonbloody. Pt additionally reports having heartburn pain after eating. She reports having similar pain in previous pregnancies and postpartum periods, and also during this pregnancy before. Pt seems mostly concerned about the dizziness/fainting spells as these episodes also happened two months ago for a week long period. She was evaluated by her OB but further referred to her primary care doctor. Pt never went to see her primary care doctor as the episodes subsided. Denies N/V, dysuria, frequency and urgency, abdominal vaginal discharge and recent travel. Denies contractions, LOF and vaginal bleeding. Feels good FM. Last BM was a small amount of diarrhea 15 minutes ago, last PO intake was at 2100, last intercourse was a week and a half ago. No complications in this pregnancy. Last PMD visit was around a month ago and pt never had a VE.     Cardio HPI:  Pt notes having intermittent episodes of dizzy spelly for the past few months where she would get flushed, feel her head and heart pounding, get dizzy, vision blacking out requiring her to grab hold of something or lay down. Episodes would last 5-10 seconds, happen 2-3 times a week then stop for a few weeks and recur. She has not had similar symptoms during her previous pregnancies but, notes that after her last pregnancy she had episodes of her heart pounding that resolved after a few months. She was seen by her PMD and OBGYN for the symptoms and was scheduled to see a cardiologist as an outpatient.   Yesterday she became febrile and could not get out of bed at all so she called EMS and came to the ED.       PAST MEDICAL & SURGICAL HISTORY  No pertinent past medical history  No significant past surgical history      FAMILY HISTORY:  FAMILY HISTORY:  No pertinent family history in first degree relatives      SOCIAL HISTORY:  []smoker  []Alcohol  []Drug    ALLERGIES:  No Known Allergies      MEDICATIONS:  MEDICATIONS  (STANDING):  lactated ringers. 1000 milliLiter(s) (125 mL/Hr) IV Continuous <Continuous>  oseltamivir 75 milliGRAM(s) Oral two times a day    MEDICATIONS  (PRN):  acetaminophen   Tablet .. 650 milliGRAM(s) Oral every 6 hours PRN Temp greater or equal to 38C (100.4F), Mild Pain (1 - 3), Moderate Pain (4 - 6), Severe Pain (7 - 10)  ALBUTerol/ipratropium for Nebulization 3 milliLiter(s) Nebulizer every 4 hours PRN Shortness of Breath      HOME MEDICATIONS:  Home Medications:      VITALS:   T(F): 98.3 ( @ 12:45), Max: 100.8 ( @ 21:15)  HR: 105 ( @ 12:45) (102 - 134)  BP: 93/50 ( @ 12:45) (84/45 - 120/60)  BP(mean): --  RR: 18 ( @ 12:45) (18 - 18)  SpO2: 100% ( @ 12:45) (97% - 100%)    Orthostatic BP  -Lyin/74    -Sittin/51    -Standin/55       I&O's Summary  -s/p 3L bolus        PHYSICAL EXAM:  NEURO: AAOX3  GEN: Not in acute distress  LUNGS: Clear to auscultation bilaterally   CARDIOVASCULAR: S1/S2 present, regular rhythm mildly tachycardic , no murmurs or rubs, no JVD, + PP bilaterally  ABD: Soft, non-tender, gravid  EXT: minimal AGUSTIN  SKIN: Intact    LABS:                        9.6    5.84  )-----------( 160      ( 2019 07:34 )             29.9     02-    140  |  103  |  3<L>  ----------------------------<  96  3.8   |  22  |  <0.5<L>    Ca    8.1<L>      2019 07:34    TPro  6.4  /  Alb  3.7  /  TBili  0.3  /  DBili  <0.2  /  AST  24  /  ALT  15  /  AlkPhos  93  -      Troponin T, Serum: <0.01 ng/mL (19 @ 19:24)    CARDIAC MARKERS ( 2019 19:24 )  x     / <0.01 ng/mL / x     / x     / x            Troponin trend:        Hemoglobin A1C   Thyroid      RADIOLOGY:  -CXR:   < from: Xray Chest 1 View AP/PA (19 @ 20:00) >  Impression:    Left basilar linear atelectasis  < end of copied text >    -TTE:  -CCTA:  -STRESS TEST:  -CATHETERIZATION:    ECG:  < from: 12 Lead ECG (19 @ 19:27) >  Ventricular Rate 136 BPM  QTC Calculation(Bezet) 436 ms  Sinus tachycardia  Possible Left atrial enlargement  Nonspecific T wave abnormality  < end of copied text >    TELEMETRY EVENTS:

## 2019-02-05 NOTE — PROGRESS NOTE ADULT - ASSESSMENT
38 yo  at 33w6d w/ JAMES of 2019, Flu A pos, w/ episodes of dizziness now resolved, s/p cardiology consultation, started on tamiflu and tylenol, clinical status improved, fetal and maternal status reassuring  - disposition home  - recommend fluid intake  - c/w oseltamavir PO at home (3 more days)  - tylenol for fevers  -  labor precautions discussed  - follow up with PMD as scheduled    Dr. Harper aware

## 2019-02-05 NOTE — CONSULT NOTE ADULT - ASSESSMENT
38 y/o now 33 weeks pregnant with influenza presents with presyncope.    # Presyncope- -likely exacerbated by acute viral illness, dehydration  -orthostatic BP positive  -troponin negative  -pressure and HR improved after IVFs bolus  -check 2D echo (r/o cardiomyopathy, Pulm HTN)  -ensure adequate hydration    The above is an interim resident provided plan to be d/w attending.  Final recs to follow. 38 y/o now 33 weeks pregnant with influenza presents with presyncope.    # Presyncope- -likely exacerbated by acute viral illness, dehydration  -orthostatic BP positive  -troponin negative  -pressure and HR improved after IVFs bolus  -check 2D echo (r/o cardiomyopathy, Pulm HTN)  -ensure adequate hydration    The above is an interim resident provided plan to be d/w attending.  Final recs to follow.     < from: Transthoracic Echocardiogram (02.05.19 @ 15:25) >  Summary:   1. Mildly enlarged left atrium.   2. Mild tricuspid regurgitation.   3. Pulmonic valve regurgitation.   4. Estimated pulmonary artery systolic pressure is 37.3 mmHg assuming a   right atrial pressure of 15 mmHg, which is consistent with borderline   pulmonary hypertension.   5. LA volume Index is 34.3 ml/m² ml/m2.    < end of copied text >    Normal LV function on 2D echo  + orthostatic  + Flu  Feels better after IVF  EKG sinus tachy  will d/w attending

## 2019-02-05 NOTE — DISCHARGE NOTE OB - CARE PLAN
Principal Discharge DX:	Influenza A  Goal:	healthy woman  Assessment and plan of treatment:	- continue with tamiflu  - tylenol for fever   - adequate fluid hydration  Secondary Diagnosis:	Pregnancy in multigravida  Assessment and plan of treatment:	- discharge home  - PO hydration recommended  -  labor precautions given  - kick count instructions discussed  - follow up with PMD as scheduled

## 2019-02-10 LAB
CULTURE RESULTS: SIGNIFICANT CHANGE UP
CULTURE RESULTS: SIGNIFICANT CHANGE UP
SPECIMEN SOURCE: SIGNIFICANT CHANGE UP
SPECIMEN SOURCE: SIGNIFICANT CHANGE UP

## 2019-02-20 DIAGNOSIS — R55 SYNCOPE AND COLLAPSE: ICD-10-CM

## 2019-02-20 DIAGNOSIS — O99.513 DISEASES OF THE RESPIRATORY SYSTEM COMPLICATING PREGNANCY, THIRD TRIMESTER: ICD-10-CM

## 2019-02-20 DIAGNOSIS — O99.283 ENDOCRINE, NUTRITIONAL AND METABOLIC DISEASES COMPLICATING PREGNANCY, THIRD TRIMESTER: ICD-10-CM

## 2019-02-20 DIAGNOSIS — J10.1 INFLUENZA DUE TO OTHER IDENTIFIED INFLUENZA VIRUS WITH OTHER RESPIRATORY MANIFESTATIONS: ICD-10-CM

## 2019-02-20 DIAGNOSIS — E86.0 DEHYDRATION: ICD-10-CM

## 2019-02-20 DIAGNOSIS — O26.893 OTHER SPECIFIED PREGNANCY RELATED CONDITIONS, THIRD TRIMESTER: ICD-10-CM

## 2019-02-20 DIAGNOSIS — Z3A.33 33 WEEKS GESTATION OF PREGNANCY: ICD-10-CM

## 2019-02-20 DIAGNOSIS — Z28.21 IMMUNIZATION NOT CARRIED OUT BECAUSE OF PATIENT REFUSAL: ICD-10-CM

## 2019-03-15 ENCOUNTER — INPATIENT (INPATIENT)
Facility: HOSPITAL | Age: 39
LOS: 2 days | Discharge: HOME | End: 2019-03-18
Attending: OBSTETRICS & GYNECOLOGY | Admitting: OBSTETRICS & GYNECOLOGY
Payer: MEDICAID

## 2019-03-15 VITALS
RESPIRATION RATE: 18 BRPM | DIASTOLIC BLOOD PRESSURE: 59 MMHG | SYSTOLIC BLOOD PRESSURE: 100 MMHG | TEMPERATURE: 98 F | HEART RATE: 109 BPM

## 2019-03-15 LAB
ALBUMIN SERPL ELPH-MCNC: 3.5 G/DL — SIGNIFICANT CHANGE UP (ref 3.5–5.2)
ALP SERPL-CCNC: 119 U/L — HIGH (ref 30–115)
ALT FLD-CCNC: 10 U/L — SIGNIFICANT CHANGE UP (ref 0–41)
ANION GAP SERPL CALC-SCNC: 12 MMOL/L — SIGNIFICANT CHANGE UP (ref 7–14)
APPEARANCE UR: ABNORMAL
AST SERPL-CCNC: 16 U/L — SIGNIFICANT CHANGE UP (ref 0–41)
BASOPHILS # BLD AUTO: 0.02 K/UL — SIGNIFICANT CHANGE UP (ref 0–0.2)
BASOPHILS NFR BLD AUTO: 0.2 % — SIGNIFICANT CHANGE UP (ref 0–1)
BILIRUB SERPL-MCNC: 0.3 MG/DL — SIGNIFICANT CHANGE UP (ref 0.2–1.2)
BILIRUB UR-MCNC: NEGATIVE — SIGNIFICANT CHANGE UP
BLD GP AB SCN SERPL QL: SIGNIFICANT CHANGE UP
BUN SERPL-MCNC: 6 MG/DL — LOW (ref 10–20)
CALCIUM SERPL-MCNC: 9 MG/DL — SIGNIFICANT CHANGE UP (ref 8.5–10.1)
CHLORIDE SERPL-SCNC: 104 MMOL/L — SIGNIFICANT CHANGE UP (ref 98–110)
CO2 SERPL-SCNC: 19 MMOL/L — SIGNIFICANT CHANGE UP (ref 17–32)
COLOR SPEC: YELLOW — SIGNIFICANT CHANGE UP
CREAT SERPL-MCNC: <0.5 MG/DL — LOW (ref 0.7–1.5)
DIFF PNL FLD: ABNORMAL
EOSINOPHIL # BLD AUTO: 0.02 K/UL — SIGNIFICANT CHANGE UP (ref 0–0.7)
EOSINOPHIL NFR BLD AUTO: 0.2 % — SIGNIFICANT CHANGE UP (ref 0–8)
EPI CELLS # UR: ABNORMAL /HPF
GLUCOSE SERPL-MCNC: 81 MG/DL — SIGNIFICANT CHANGE UP (ref 70–99)
GLUCOSE UR QL: NEGATIVE MG/DL — SIGNIFICANT CHANGE UP
HCT VFR BLD CALC: 35.5 % — LOW (ref 37–47)
HGB BLD-MCNC: 11.6 G/DL — LOW (ref 12–16)
IMM GRANULOCYTES NFR BLD AUTO: 0.5 % — HIGH (ref 0.1–0.3)
KETONES UR-MCNC: 15
LEUKOCYTE ESTERASE UR-ACNC: ABNORMAL
LYMPHOCYTES # BLD AUTO: 1.72 K/UL — SIGNIFICANT CHANGE UP (ref 1.2–3.4)
LYMPHOCYTES # BLD AUTO: 18.1 % — LOW (ref 20.5–51.1)
MCHC RBC-ENTMCNC: 27.7 PG — SIGNIFICANT CHANGE UP (ref 27–31)
MCHC RBC-ENTMCNC: 32.7 G/DL — SIGNIFICANT CHANGE UP (ref 32–37)
MCV RBC AUTO: 84.7 FL — SIGNIFICANT CHANGE UP (ref 81–99)
MONOCYTES # BLD AUTO: 0.75 K/UL — HIGH (ref 0.1–0.6)
MONOCYTES NFR BLD AUTO: 7.9 % — SIGNIFICANT CHANGE UP (ref 1.7–9.3)
NEUTROPHILS # BLD AUTO: 6.96 K/UL — HIGH (ref 1.4–6.5)
NEUTROPHILS NFR BLD AUTO: 73.1 % — SIGNIFICANT CHANGE UP (ref 42.2–75.2)
NITRITE UR-MCNC: NEGATIVE — SIGNIFICANT CHANGE UP
NRBC # BLD: 0 /100 WBCS — SIGNIFICANT CHANGE UP (ref 0–0)
PCP SPEC-MCNC: SIGNIFICANT CHANGE UP
PH UR: 6.5 — SIGNIFICANT CHANGE UP (ref 5–8)
PLATELET # BLD AUTO: 198 K/UL — SIGNIFICANT CHANGE UP (ref 130–400)
POTASSIUM SERPL-MCNC: 4 MMOL/L — SIGNIFICANT CHANGE UP (ref 3.5–5)
POTASSIUM SERPL-SCNC: 4 MMOL/L — SIGNIFICANT CHANGE UP (ref 3.5–5)
PRENATAL SYPHILIS TEST: SIGNIFICANT CHANGE UP
PROT SERPL-MCNC: 6.1 G/DL — SIGNIFICANT CHANGE UP (ref 6–8)
PROT UR-MCNC: NEGATIVE MG/DL — SIGNIFICANT CHANGE UP
RBC # BLD: 4.19 M/UL — LOW (ref 4.2–5.4)
RBC # FLD: 14.6 % — HIGH (ref 11.5–14.5)
RBC CASTS # UR COMP ASSIST: SIGNIFICANT CHANGE UP /HPF
SODIUM SERPL-SCNC: 135 MMOL/L — SIGNIFICANT CHANGE UP (ref 135–146)
SP GR SPEC: 1.01 — SIGNIFICANT CHANGE UP (ref 1.01–1.03)
TYPE + AB SCN PNL BLD: SIGNIFICANT CHANGE UP
UROBILINOGEN FLD QL: 1 MG/DL (ref 0.2–0.2)
WBC # BLD: 9.52 K/UL — SIGNIFICANT CHANGE UP (ref 4.8–10.8)
WBC # FLD AUTO: 9.52 K/UL — SIGNIFICANT CHANGE UP (ref 4.8–10.8)
WBC UR QL: ABNORMAL /HPF

## 2019-03-15 PROCEDURE — 59400 OBSTETRICAL CARE: CPT | Mod: U9

## 2019-03-15 RX ORDER — ACETAMINOPHEN 500 MG
650 TABLET ORAL EVERY 6 HOURS
Qty: 0 | Refills: 0 | Status: DISCONTINUED | OUTPATIENT
Start: 2019-03-15 | End: 2019-03-18

## 2019-03-15 RX ORDER — OXYCODONE AND ACETAMINOPHEN 5; 325 MG/1; MG/1
1 TABLET ORAL
Qty: 0 | Refills: 0 | Status: DISCONTINUED | OUTPATIENT
Start: 2019-03-15 | End: 2019-03-18

## 2019-03-15 RX ORDER — DIBUCAINE 1 %
1 OINTMENT (GRAM) RECTAL EVERY 4 HOURS
Qty: 0 | Refills: 0 | Status: DISCONTINUED | OUTPATIENT
Start: 2019-03-15 | End: 2019-03-18

## 2019-03-15 RX ORDER — ONDANSETRON 8 MG/1
4 TABLET, FILM COATED ORAL EVERY 6 HOURS
Qty: 0 | Refills: 0 | Status: DISCONTINUED | OUTPATIENT
Start: 2019-03-15 | End: 2019-03-18

## 2019-03-15 RX ORDER — BENZOCAINE 10 %
1 GEL (GRAM) MUCOUS MEMBRANE EVERY 6 HOURS
Qty: 0 | Refills: 0 | Status: DISCONTINUED | OUTPATIENT
Start: 2019-03-15 | End: 2019-03-18

## 2019-03-15 RX ORDER — LANOLIN
1 OINTMENT (GRAM) TOPICAL EVERY 6 HOURS
Qty: 0 | Refills: 0 | Status: DISCONTINUED | OUTPATIENT
Start: 2019-03-15 | End: 2019-03-18

## 2019-03-15 RX ORDER — IBUPROFEN 200 MG
600 TABLET ORAL EVERY 6 HOURS
Qty: 0 | Refills: 0 | Status: DISCONTINUED | OUTPATIENT
Start: 2019-03-15 | End: 2019-03-18

## 2019-03-15 RX ORDER — NALOXONE HYDROCHLORIDE 4 MG/.1ML
0.1 SPRAY NASAL
Qty: 0 | Refills: 0 | Status: DISCONTINUED | OUTPATIENT
Start: 2019-03-15 | End: 2019-03-18

## 2019-03-15 RX ORDER — GLYCERIN ADULT
1 SUPPOSITORY, RECTAL RECTAL AT BEDTIME
Qty: 0 | Refills: 0 | Status: DISCONTINUED | OUTPATIENT
Start: 2019-03-15 | End: 2019-03-18

## 2019-03-15 RX ORDER — OXYTOCIN 10 UNIT/ML
41.67 VIAL (ML) INJECTION
Qty: 20 | Refills: 0 | Status: DISCONTINUED | OUTPATIENT
Start: 2019-03-15 | End: 2019-03-16

## 2019-03-15 RX ORDER — DIPHENHYDRAMINE HCL 50 MG
25 CAPSULE ORAL EVERY 6 HOURS
Qty: 0 | Refills: 0 | Status: DISCONTINUED | OUTPATIENT
Start: 2019-03-15 | End: 2019-03-18

## 2019-03-15 RX ORDER — SIMETHICONE 80 MG/1
80 TABLET, CHEWABLE ORAL EVERY 6 HOURS
Qty: 0 | Refills: 0 | Status: DISCONTINUED | OUTPATIENT
Start: 2019-03-15 | End: 2019-03-18

## 2019-03-15 RX ORDER — AER TRAVELER 0.5 G/1
1 SOLUTION RECTAL; TOPICAL EVERY 4 HOURS
Qty: 0 | Refills: 0 | Status: DISCONTINUED | OUTPATIENT
Start: 2019-03-15 | End: 2019-03-18

## 2019-03-15 RX ORDER — MAGNESIUM HYDROXIDE 400 MG/1
30 TABLET, CHEWABLE ORAL
Qty: 0 | Refills: 0 | Status: DISCONTINUED | OUTPATIENT
Start: 2019-03-15 | End: 2019-03-18

## 2019-03-15 RX ORDER — SODIUM CHLORIDE 9 MG/ML
1000 INJECTION, SOLUTION INTRAVENOUS
Qty: 0 | Refills: 0 | Status: DISCONTINUED | OUTPATIENT
Start: 2019-03-15 | End: 2019-03-16

## 2019-03-15 RX ORDER — DOCUSATE SODIUM 100 MG
100 CAPSULE ORAL
Qty: 0 | Refills: 0 | Status: DISCONTINUED | OUTPATIENT
Start: 2019-03-15 | End: 2019-03-18

## 2019-03-15 RX ORDER — OXYTOCIN 10 UNIT/ML
41.67 VIAL (ML) INJECTION
Qty: 20 | Refills: 0 | Status: DISCONTINUED | OUTPATIENT
Start: 2019-03-15 | End: 2019-03-18

## 2019-03-15 RX ORDER — SODIUM CHLORIDE 9 MG/ML
125 INJECTION, SOLUTION INTRAVENOUS ONCE
Qty: 0 | Refills: 0 | Status: DISCONTINUED | OUTPATIENT
Start: 2019-03-15 | End: 2019-03-16

## 2019-03-15 RX ADMIN — Medication 600 MILLIGRAM(S): at 23:37

## 2019-03-15 NOTE — OB PROVIDER DELIVERY SUMMARY - NSPROVIDERDELIVERYNOTE_OBGYN_ALL_OB_FT
Patient was fully dilated and pushed. NSD live female , Apgars 9/9. Vtx delivered OA   ,  Fetal head restituted to ROT. The anterior and posterior shoulders delivered, followed by the remaining body atraumatically. Delayed cord clamping was performed, and then clamped and cut. Cord blood & gases collected. The  was handed to mother for skin to skin. The placenta delivered spontaneously intact with 3v cord. Uterus massaged and firm with oxytocin given IV. Cervix, vagina and perineum inspected   . Repair of  a small fist degree perineal laceration , in the usual fashion with 3-0 chromic.   EBL -300cc, hemostasis assured.

## 2019-03-15 NOTE — OB PROVIDER H&P - ASSESSMENT
38 yo  at 39w2d, GBS neg, AMA, w/ dizziness episodes throughout the pregnancy, cleared by cardiology, possible mild pulmonary hypertension, in labor,     -Admit  -Admission labs  -IVF  -Monitor VS per routine  -Continuous EFM/toco  -Pain management prn  -Needs MMR PP    Dr. Harper aware and Dr. Rodriguez to be made aware.

## 2019-03-15 NOTE — OB PROVIDER H&P - NSHPLABSRESULTS_GEN_ALL_CORE
GCT 88    Sonos:   10w5d: CRL 41 mm corresponding to 11w  19w6d: posterior placenta, 4ch, 3vc, stomach, bladder, kidneys visualized,  grams (35%)  26w1d: AGA 48%, posterior placenta, MVP 4.6 cm, anatomy wnl, BPP 8/8

## 2019-03-15 NOTE — OB PROVIDER H&P - HISTORY OF PRESENT ILLNESS
40 yo  at 39w2d w/ JAMES of 3/20/2019 here for contractions that started at 0800, 9/10 intensity, q5-7min. Denies LOF and cruz VB. No complications in this pregnancy other than being hospitalized for flu and episodes of dizziness for which she was cleared by our cardiology department, suspected of having mild pulmonary hypertension. Last PMD visit was 3/14/2019, VE was not done.

## 2019-03-15 NOTE — OB RN PATIENT PROFILE - PRO PRENATAL LABS ORI SOURCE RUBELLA
SPECIMEN  1) Left labium majorum.  2) Right labium minorum.  FINAL PATHOLOGIC DIAGNOSIS  BIOPSY OF 1. LEFT LABIUM MAJORUM AND 2. LEFT LABIA MINORA:  SUPERFICIAL SQUAMOUS ACUTE INFLAMMATION WITH MANY FUNGAL FORMS MORPHOLOGICALLY  COMPATIBLE WITH SPENSER  NO CARCINOMA OR DYSPLASIA IDENTIFIED  GMS STAINS WERE USED IN THIS EVALUATION  THE CONTROLS STAINED APPROPRIATELY      On exam biopsy sites are well healed and rash is improving.    hard copy, drawn during this pregnancy

## 2019-03-15 NOTE — OB PROVIDER H&P - ATTENDING COMMENTS
38 yo  at 39w2d, GBS neg, AMA, w/ dizziness episodes throughout the pregnancy, echo had shown possible mild pulmonary on prior admission but patient was cleared by cardiology,  in labor,  -admit   -iv access  -for epidural   -arom next exam

## 2019-03-15 NOTE — OB PROVIDER H&P - NS_OBGYNHISTORY_OBGYN_ALL_OB_FT
OB Hx: FT  x6, largest 8-12lbs, no complications  GYN Hx: denies h/o fibroids, abnormal paps, ovarian cysts and STIs

## 2019-03-15 NOTE — OB PROVIDER H&P - NSHPPHYSICALEXAM_GEN_ALL_CORE
Vital Signs Last 24 Hrs  T(C): 36.7 (15 Mar 2019 20:23), Max: 36.7 (15 Mar 2019 20:07)  T(F): 98.1 (15 Mar 2019 20:23), Max: 98.1 (15 Mar 2019 20:07)  HR: 111 (15 Mar 2019 20:30) (109 - 111)  BP: 120/76 (15 Mar 2019 20:30) (100/59 - 120/76)  RR: 18 (15 Mar 2019 20:23) (18 - 18)    Udip: 15 ketones  EFM: 140/mod/accel+  [place]: irregular  Abd: NT, gravid, mildly palpable contractions  SVE: 4-5/90/-1, intact and vertex as per Dr. Harper's exam  EFW by Leopold's: 3400 grams

## 2019-03-16 LAB
AMPHET UR-MCNC: NEGATIVE — SIGNIFICANT CHANGE UP
BARBITURATES UR SCN-MCNC: NEGATIVE — SIGNIFICANT CHANGE UP
BASOPHILS # BLD AUTO: 0.02 K/UL — SIGNIFICANT CHANGE UP (ref 0–0.2)
BASOPHILS NFR BLD AUTO: 0.2 % — SIGNIFICANT CHANGE UP (ref 0–1)
BENZODIAZ UR-MCNC: NEGATIVE — SIGNIFICANT CHANGE UP
COCAINE METAB.OTHER UR-MCNC: NEGATIVE — SIGNIFICANT CHANGE UP
EOSINOPHIL # BLD AUTO: 0.03 K/UL — SIGNIFICANT CHANGE UP (ref 0–0.7)
EOSINOPHIL NFR BLD AUTO: 0.3 % — SIGNIFICANT CHANGE UP (ref 0–8)
HCT VFR BLD CALC: 38.4 % — SIGNIFICANT CHANGE UP (ref 37–47)
HGB BLD-MCNC: 12.2 G/DL — SIGNIFICANT CHANGE UP (ref 12–16)
IMM GRANULOCYTES NFR BLD AUTO: 0.6 % — HIGH (ref 0.1–0.3)
LYMPHOCYTES # BLD AUTO: 1.37 K/UL — SIGNIFICANT CHANGE UP (ref 1.2–3.4)
LYMPHOCYTES # BLD AUTO: 12 % — LOW (ref 20.5–51.1)
MCHC RBC-ENTMCNC: 27.3 PG — SIGNIFICANT CHANGE UP (ref 27–31)
MCHC RBC-ENTMCNC: 31.8 G/DL — LOW (ref 32–37)
MCV RBC AUTO: 85.9 FL — SIGNIFICANT CHANGE UP (ref 81–99)
METHADONE UR-MCNC: NEGATIVE — SIGNIFICANT CHANGE UP
MONOCYTES # BLD AUTO: 0.86 K/UL — HIGH (ref 0.1–0.6)
MONOCYTES NFR BLD AUTO: 7.5 % — SIGNIFICANT CHANGE UP (ref 1.7–9.3)
NEUTROPHILS # BLD AUTO: 9.07 K/UL — HIGH (ref 1.4–6.5)
NEUTROPHILS NFR BLD AUTO: 79.4 % — HIGH (ref 42.2–75.2)
NRBC # BLD: 0 /100 WBCS — SIGNIFICANT CHANGE UP (ref 0–0)
OPIATES UR-MCNC: NEGATIVE — SIGNIFICANT CHANGE UP
PLATELET # BLD AUTO: 205 K/UL — SIGNIFICANT CHANGE UP (ref 130–400)
PROPOXYPHENE QUALITATIVE URINE RESULT: NEGATIVE — SIGNIFICANT CHANGE UP
RBC # BLD: 4.47 M/UL — SIGNIFICANT CHANGE UP (ref 4.2–5.4)
RBC # FLD: 14.5 % — SIGNIFICANT CHANGE UP (ref 11.5–14.5)
WBC # BLD: 11.42 K/UL — HIGH (ref 4.8–10.8)
WBC # FLD AUTO: 11.42 K/UL — HIGH (ref 4.8–10.8)

## 2019-03-16 RX ADMIN — Medication 600 MILLIGRAM(S): at 13:50

## 2019-03-16 RX ADMIN — Medication 1 TABLET(S): at 11:06

## 2019-03-16 NOTE — PROGRESS NOTE ADULT - SUBJECTIVE AND OBJECTIVE BOX
OB attending  PPD #1    Pt doing well, pain well controlled. No overnight events, no acute complaints.    Ambulating: Yes  Voiding: Yes  Flatus: Yes  Bowel movements: Yes   Breast or bottle feeding: Breastfeeding  Diet: Regular    PAST MEDICAL & SURGICAL HISTORY:  No pertinent past medical history  No significant past surgical history      Physical Exam  Vital Signs Last 24 Hrs  T(C): 36.7 (16 Mar 2019 01:27), Max: 36.9 (15 Mar 2019 20:30)  T(F): 98 (16 Mar 2019 01:27), Max: 98.42 (15 Mar 2019 20:30)  HR: 79 (16 Mar 2019 01:) (67 - 116)  BP: 128/58 (16 Mar 2019 01:) (96/56 - 128/58)  BP(mean): --  RR: 18 (16 Mar 2019 01:27) (18 - 20)  SpO2: --  Gen: AAOx3, NAD  Abd: Soft, nontender, nondistended, BS+  Fundus: Firm, below umbilicus  Lochia: normal  Ext: No calf tenderness, no swelling    Labs:                        11.6   9.52  )-----------( 198      ( 15 Mar 2019 21:00 )             35.5     rh+    A/P:  s/p , PPD #1, doing well  - continue current management  -check cbc   -d/c in Am

## 2019-03-17 NOTE — PROGRESS NOTE ADULT - SUBJECTIVE AND OBJECTIVE BOX
OB attending  PPD #2    Pt doing well, pain well controlled. No overnight events, no acute complaints.    Ambulating: Yes  Voiding: Yes  Flatus: Yes  Bowel movements: Yes   Breast or bottle feeding: Breastfeeding  Diet: Regular    PAST MEDICAL & SURGICAL HISTORY:  No pertinent past medical history  No significant past surgical history      Physical Exam  Vital Signs Last 24 Hrs  T(C): 36.1 (17 Mar 2019 07:58), Max: 36.1 (17 Mar 2019 07:58)  T(F): 97 (17 Mar 2019 07:58), Max: 97 (17 Mar 2019 07:58)  HR: 64 (17 Mar 2019 07:58) (64 - 96)  BP: 98/50 (17 Mar 2019 07:58) (96/63 - 124/59)  BP(mean): --  RR: 18 (17 Mar 2019 07:58) (16 - 18)  SpO2: --  Gen: AAOx3, NAD  Abd: Soft, nontender, nondistended, BS+  Fundus: Firm, below umbilicus  Lochia: normal  Ext: No calf tenderness, no swelling    Labs:                        12.2   11.42 )-----------( 205      ( 16 Mar 2019 11:18 )             38.4       rh+  A/P: s/p , PPD #2, doing well  - continue current management  -d/c in am

## 2019-03-18 ENCOUNTER — TRANSCRIPTION ENCOUNTER (OUTPATIENT)
Age: 39
End: 2019-03-18

## 2019-03-18 VITALS
HEART RATE: 80 BPM | TEMPERATURE: 97 F | SYSTOLIC BLOOD PRESSURE: 90 MMHG | RESPIRATION RATE: 19 BRPM | DIASTOLIC BLOOD PRESSURE: 57 MMHG

## 2019-03-18 RX ORDER — IBUPROFEN 200 MG
1 TABLET ORAL
Qty: 0 | Refills: 0 | DISCHARGE
Start: 2019-03-18

## 2019-03-18 RX ORDER — BENZOCAINE 10 %
1 GEL (GRAM) MUCOUS MEMBRANE
Qty: 0 | Refills: 0 | DISCHARGE
Start: 2019-03-18

## 2019-03-18 RX ADMIN — Medication 1 TABLET(S): at 12:06

## 2019-03-18 NOTE — DISCHARGE NOTE OB - CARE PROVIDER_API CALL
Brett Harper)  Obstetrics and Gynecology  [address] [place] [address]  Phone: [phone]  Fax: [phone]  Follow Up Time:

## 2019-03-18 NOTE — DISCHARGE NOTE OB - CARE PLAN
Principal Discharge DX:	Vaginal delivery  Goal:	nothing for vagina x 6 wks  Assessment and plan of treatment:	call for t.100.4 or heavy bleeding

## 2019-03-18 NOTE — PROGRESS NOTE ADULT - SUBJECTIVE AND OBJECTIVE BOX
OB attending  PPD #2    Pt doing well, pain well controlled. No overnight events, no acute complaints.    Ambulating: Yes  Voiding: Yes  Flatus: Yes  Bowel movements: Yes   Breast or bottle feeding: Breastfeeding  Diet: Regular    PAST MEDICAL & SURGICAL HISTORY:  No pertinent past medical history  No significant past surgical history      Physical Exam  Vital Signs Last 24 Hrs  T(C): 36.8 (18 Mar 2019 00:09), Max: 36.8 (18 Mar 2019 00:09)  T(F): 98.3 (18 Mar 2019 00:09), Max: 98.3 (18 Mar 2019 00:09)  HR: 106 (18 Mar 2019 00:09) (102 - 106)  BP: 106/57 (18 Mar 2019 00:09) (94/63 - 106/57)  BP(mean): --  RR: 18 (18 Mar 2019 00:09) (18 - 18)  SpO2: --  Gen: AAOx3, NAD  Abd: Soft, nontender, nondistended, BS+  Fundus: Firm, below umbilicus  Lochia: normal  Ext: No calf tenderness, no swelling    Labs:                        12.2   11.42 )-----------( 205      ( 16 Mar 2019 11:18 )             38.4         A/P: s/p , PPD #1, doing well  - continue current management  -d/c home with instructions  -f/u 4-6 wks for pp exam OB attending  PPD #2    Pt doing well, pain well controlled. No overnight events, no acute complaints.    Ambulating: Yes  Voiding: Yes  Flatus: Yes  Bowel movements: Yes   Breast or bottle feeding: Breastfeeding  Diet: Regular    PAST MEDICAL & SURGICAL HISTORY:  No pertinent past medical history  No significant past surgical history      Physical Exam  Vital Signs Last 24 Hrs  T(C): 36.8 (18 Mar 2019 00:09), Max: 36.8 (18 Mar 2019 00:09)  T(F): 98.3 (18 Mar 2019 00:09), Max: 98.3 (18 Mar 2019 00:09)  HR: 106 (18 Mar 2019 00:09) (102 - 106)  BP: 106/57 (18 Mar 2019 00:09) (94/63 - 106/57)  BP(mean): --  RR: 18 (18 Mar 2019 00:09) (18 - 18)  SpO2: --  Gen: AAOx3, NAD  Abd: Soft, nontender, nondistended, BS+  Fundus: Firm, below umbilicus  Lochia: normal  Ext: No calf tenderness, no swelling    Labs:                        12.2   11.42 )-----------( 205      ( 16 Mar 2019 11:18 )             38.4         A/P: s/p , PPD #1, doing well  - continue current management  -d/c home with instructions  -f/u 4-6 wks for pp exam   -MMR at d/c

## 2019-03-18 NOTE — DISCHARGE NOTE OB - MEDICATION SUMMARY - MEDICATIONS TO TAKE
I will START or STAY ON the medications listed below when I get home from the hospital:    ibuprofen 600 mg oral tablet  -- 1 tab(s) by mouth every 6 hours, As needed, Moderate Pain (4 - 6)  -- Indication: For pain    benzocaine 20% topical spray  -- 1 spray(s) on skin every 6 hours, As needed, Perineal discomfort  -- Indication: For pain

## 2019-03-18 NOTE — DISCHARGE NOTE OB - COMPLETE THE FOLLOWING IF THE PATIENT REFUSES THE INFLUENZA VACCINE:
Have You Had Botox Before?: has had botox
Vaccine Information Sheet (VIS) provided-VIS date: 8/07/15/Risks/benefits discussed with patient/surrogate

## 2019-03-18 NOTE — DISCHARGE NOTE OB - PATIENT PORTAL LINK FT
You can access the [facility], offered by [facility], by registering with the following website: [email or web address]

## 2019-03-21 DIAGNOSIS — I27.20 PULMONARY HYPERTENSION, UNSPECIFIED: ICD-10-CM

## 2019-03-21 DIAGNOSIS — Z3A.39 39 WEEKS GESTATION OF PREGNANCY: ICD-10-CM

## 2019-06-05 NOTE — OB RN PATIENT PROFILE - PRO PRENATAL LABS ORI SOURCE HBSAG
Tried contacting Gerald Dinh but the number she gave yesterday states voice mail is not set up. Called patient but had to leave voice mail for her to call office back. hard copy, drawn during this pregnancy

## 2020-02-19 NOTE — DISCHARGE NOTE OB - PLAN OF CARE
Influenza vaccine offered but not given today because:  The parent/guardian refuses influenza vaccination     healthy woman - continue with tamiflu  - tylenol for fever   - adequate fluid hydration - discharge home  - PO hydration recommended  -  labor precautions given  - kick count instructions discussed  - follow up with PMD as scheduled

## 2020-05-14 NOTE — OB RN PATIENT PROFILE - SPIRITUAL CULTURAL, RELIGIOUS PRACTICES/VALUES, PROFILE
Subjective:      [place] is a 8 m.o. male here with mother. Patient brought in for Follow-up (saw Pepper Tuesday- symptoms started 5/11/20); Fever (fussy-decreased appetite-increased sleep-decreased urine); and Diarrhea (watery)      History of Present Illness:  Fever   This is a new problem. The current episode started in the past 7 days (5/11). Progression since onset: seen on 5/12 for fever, COVID neg. Associated symptoms include coughing (mild, dry) and a fever (up to 100.6). Pertinent negatives include no rash or vomiting (increased spit up). He has tried acetaminophen (pedialyte) for the symptoms.       Patient Active Problem List    Diagnosis Date Noted    Vaccination refused by parent 03/11/2020    Behind on immunizations 02/26/2020         Review of Systems   Constitutional: Positive for activity change, appetite change (karla take some formula, does not want to eat), fever (up to 100.6) and irritability.   HENT: Positive for drooling (teething) and rhinorrhea.    Respiratory: Positive for cough (mild, dry).    Gastrointestinal: Positive for diarrhea (loose stools, 4-8 times a day). Negative for blood in stool (stool red after drink) and vomiting (increased spit up).   Genitourinary: Positive for decreased urine volume.   Skin: Negative for rash.       Objective:     Physical Exam   Constitutional: He is consolable.  Non-toxic appearance. He appears ill. No distress.   HENT:   Head: Anterior fontanelle is flat.   Right Ear: Tympanic membrane normal.   Left Ear: Tympanic membrane normal.   Nose: Rhinorrhea present.   Mouth/Throat: Mucous membranes are moist. Pharynx erythema present. No oropharyngeal exudate.   Eyes: Conjunctivae are normal.   Neck: Neck supple.   Cardiovascular: Normal rate and regular rhythm.   No murmur heard.  Pulmonary/Chest: Effort normal and breath sounds normal. He has no wheezes. He has no rhonchi.   Abdominal: Soft. He exhibits distension. He exhibits no mass. Bowel  "sounds are increased. There is no hepatosplenomegaly. There is no tenderness.   Lymphadenopathy:     He has no cervical adenopathy.   Neurological: He is alert.   Skin: Skin is warm. Turgor is normal. No rash noted. No pallor.       Assessment:        1. Fever, unspecified fever cause    2. Diarrhea, unspecified type    3. Pharyngitis, unspecified etiology         Plan:       Orders Placed This Encounter   Procedures    Strep A culture, throat    Urine culture    Stool culture    CBC auto differential    Comprehensive metabolic panel    Stool Exam-Ova,Cysts,Parasites    Giardia / Cryptosporidum, EIA    Rotavirus antigen, stool    Occult blood x 1, stool    Adenovirus Molecular Detection, PCR, Non-Blood Stool    POCT rapid strep A    POCT urinalysis, dipstick or tablet reag         Results for orders placed or performed in visit on 05/14/20   POCT rapid strep A   Result Value Ref Range    Rapid Strep A Screen Negative Negative     Acceptable Yes    POCT urinalysis, dipstick or tablet reag   Result Value Ref Range    Color, UA yellow     Spec Grav UA 1.025     pH, UA 5     WBC, UA Neg     Nitrite, UA Neg     Protein Trace     Glucose, UA Norm     Ketones, UA Neg     Urobilinogen, UA Norm     Bilirubin Neg     Blood, UA Trace        Patient Instructions   · Rapid strep is negative.  Will watch culture for 1-2 days.  · Urinalysis looks good, will also watch urine culture for 48 hours.  · Will call with results of blood work.  · Stool tests ordered.  · Most likely self-limited viral illness.  Continue to maintain hydration.      Your child has a stomach virus.  This may take 5-7 days to completely run its course.  The most important treatment is to prevent dehydration.  Start with clear liquids (Pedialyte or "half-strength" formula with Pedialyte).  Small amounts given frequently better than occasional large bottle.  Avoid too much juice, as this can make the diarrhea worse.    Gradually " advance to normal diet slowly as tolerated.  Give in small portions, then gradually work up to regular food for age.  This may take a few days.    Avoid anti-diarrheal medicines if possible, as it is preferred for diarrhea to run its course naturally.    Return for:   · fever >100.3 more than 5 days  · increasing abdominal pain (especially lower right)  · Signs of dehydration:  decreased urine output, no tears, lips dry/cracked, eyes sunken  · Lethargic  · blood in stool  · diarrhea that lasts more than a week            Discussed with mom, may resume vaccinations at 9 month well.  Probably 2 at a time.   jakob [facility]

## 2020-10-25 ENCOUNTER — FORM ENCOUNTER (OUTPATIENT)
Age: 40
End: 2020-10-25

## 2020-10-29 NOTE — OB PROVIDER H&P - PRO INTERPRETER NEED 2
Received call from [facility] with [facility] and she states they are able to accept and she is initiating precert at this time. Discussed this with patient and she verbalized understanding and agreement. Discussed second option of SNF but she states that if her insurance does not approve [facility] stay, she just wants to go home with home care. Should have an answer tomorrow and will follow up with patient. English

## 2022-07-22 NOTE — OB PROVIDER H&P - NSGESTATIONAGE4_OBGYN_ALL_OB_NU
42 Azathioprine Counseling:  I discussed with the patient the risks of azathioprine including but not limited to myelosuppression, immunosuppression, hepatotoxicity, lymphoma, and infections.  The patient understands that monitoring is required including baseline LFTs, Creatinine, possible TPMP genotyping and weekly CBCs for the first month and then every 2 weeks thereafter.  The patient verbalized understanding of the proper use and possible adverse effects of azathioprine.  All of the patient's questions and concerns were addressed.

## 2023-01-05 ENCOUNTER — APPOINTMENT (OUTPATIENT)
Dept: OBGYN | Facility: CLINIC | Age: 43
End: 2023-01-05
Payer: COMMERCIAL

## 2023-01-05 VITALS
WEIGHT: 168 LBS | HEIGHT: 62 IN | DIASTOLIC BLOOD PRESSURE: 72 MMHG | BODY MASS INDEX: 30.91 KG/M2 | SYSTOLIC BLOOD PRESSURE: 111 MMHG

## 2023-01-05 DIAGNOSIS — Z01.419 ENCOUNTER FOR GYNECOLOGICAL EXAMINATION (GENERAL) (ROUTINE) W/OUT ABNORMAL FINDINGS: ICD-10-CM

## 2023-01-05 LAB
BILIRUB UR QL STRIP: NORMAL
GLUCOSE UR-MCNC: NORMAL
HCG UR QL: 0.2 EU/DL
HGB UR QL STRIP.AUTO: NORMAL
KETONES UR-MCNC: 15
LEUKOCYTE ESTERASE UR QL STRIP: NORMAL
NITRITE UR QL STRIP: NORMAL
PH UR STRIP: 5.5
PROT UR STRIP-MCNC: NORMAL
SP GR UR STRIP: 1.01

## 2023-01-05 PROCEDURE — 81003 URINALYSIS AUTO W/O SCOPE: CPT | Mod: QW

## 2023-01-05 PROCEDURE — 76817 TRANSVAGINAL US OBSTETRIC: CPT

## 2023-01-05 PROCEDURE — 99212 OFFICE O/P EST SF 10 MIN: CPT | Mod: 25

## 2023-01-05 PROCEDURE — 99396 PREV VISIT EST AGE 40-64: CPT

## 2023-01-05 NOTE — PHYSICAL EXAM
[Chaperone Present] : A chaperone was present in the examining room during all aspects of the physical examination [Appropriately responsive] : appropriately responsive [Alert] : alert [No Acute Distress] : no acute distress [No Murmurs] : no murmurs [Soft] : soft [Non-tender] : non-tender [Non-distended] : non-distended [No HSM] : No HSM [No Lesions] : no lesions [No Mass] : no mass [Oriented x3] : oriented x3 [Labia Majora] : normal [Labia Minora] : normal [Uterine Adnexae] : normal [FreeTextEntry1] : jose  [Examination Of The Breasts] : a normal appearance [Normal] : normal [No Masses] : no breast masses were palpable

## 2023-01-05 NOTE — COUNSELING
[Breast Self Exam] : breast self exam [Pregnancy Options] : pregnancy options [Influenza Vaccine] : influenza vaccine [Lab Results] : lab results [Medication Management] : medication management

## 2023-01-05 NOTE — PROCEDURE
[Transvaginal OB Sonogram] : Transvaginal OB Sonogram [Intrauterine Pregnancy] : intrauterine pregnancy [Yolk Sac] : yolk sac present [Fetal Heart] : fetal heart present [Date: ___] : Date: [unfilled] [Current GA by Sonogram: ___ (wks)] : Current GA by Sonogram: [unfilled]Uwks [___ day(s)] : [unfilled] days [Transvaginal OB Sonogram WNL] : Transvaginal OB Sonogram WNL [FreeTextEntry1] : normal exam, crl 8.4 week spos fh, pos fm, edc c/w llmp 8/13/2023

## 2023-01-05 NOTE — HISTORY OF PRESENT ILLNESS
[FreeTextEntry1] : pt comes in for annual exam an prenatal care \par  x 7 largest 8+, no complications full term \par no vag bleeding\par  reg cycles no menomet \par no meds\par  urine dip neg for uti \par

## 2023-01-05 NOTE — PLAN
Abdominal pain
[FreeTextEntry1] : annual exam \par pap gc chsl sent from the office\par  pnc\par labs sent from the office\par declines genetics\par  declines flu vaccine\par  n/v 4 weeks

## 2023-01-06 LAB
ABO + RH PNL BLD: NORMAL
BASOPHILS # BLD AUTO: 0.03 K/UL
BASOPHILS NFR BLD AUTO: 0.3 %
BLD GP AB SCN SERPL QL: NORMAL
C TRACH RRNA SPEC QL NAA+PROBE: NOT DETECTED
EOSINOPHIL # BLD AUTO: 0.03 K/UL
EOSINOPHIL NFR BLD AUTO: 0.3 %
HBV SURFACE AG SER QL: NONREACTIVE
HCT VFR BLD CALC: 40.5 %
HGB BLD-MCNC: 13.3 G/DL
HIV1+2 AB SPEC QL IA.RAPID: NONREACTIVE
IMM GRANULOCYTES NFR BLD AUTO: 0.3 %
LYMPHOCYTES # BLD AUTO: 1.83 K/UL
LYMPHOCYTES NFR BLD AUTO: 15.9 %
MAN DIFF?: NORMAL
MCHC RBC-ENTMCNC: 29.8 PG
MCHC RBC-ENTMCNC: 32.8 GM/DL
MCV RBC AUTO: 90.6 FL
MONOCYTES # BLD AUTO: 0.9 K/UL
MONOCYTES NFR BLD AUTO: 7.8 %
N GONORRHOEA RRNA SPEC QL NAA+PROBE: NOT DETECTED
NEUTROPHILS # BLD AUTO: 8.68 K/UL
NEUTROPHILS NFR BLD AUTO: 75.4 %
PLATELET # BLD AUTO: 262 K/UL
RBC # BLD: 4.47 M/UL
RBC # FLD: 13.7 %
SOURCE TP AMPLIFICATION: NORMAL
WBC # FLD AUTO: 11.51 K/UL

## 2023-01-07 LAB
BACTERIA UR CULT: NORMAL
LEAD BLD-MCNC: <1 UG/DL
T PALLIDUM AB SER QL IA: NEGATIVE

## 2023-01-08 LAB
MEV IGG FLD QL IA: 25 AU/ML
MEV IGG+IGM SER-IMP: POSITIVE
MUV AB SER-ACNC: NORMAL
MUV IGG SER QL IA: 10 AU/ML
RUBV IGG FLD-ACNC: 1.2 INDEX
RUBV IGG SER-IMP: POSITIVE
VZV AB TITR SER: POSITIVE
VZV IGG SER IF-ACNC: 221.7 INDEX

## 2023-01-18 LAB
B19V IGG SER QL IA: 3.54 INDEX
B19V IGG+IGM SER-IMP: NORMAL
B19V IGG+IGM SER-IMP: POSITIVE
B19V IGM FLD-ACNC: 0.14 INDEX
B19V IGM SER-ACNC: NEGATIVE
CYTOLOGY CVX/VAG DOC THIN PREP: NORMAL

## 2023-01-24 ENCOUNTER — APPOINTMENT (OUTPATIENT)
Dept: OBGYN | Facility: CLINIC | Age: 43
End: 2023-01-24
Payer: COMMERCIAL

## 2023-01-24 VITALS — DIASTOLIC BLOOD PRESSURE: 72 MMHG | WEIGHT: 168 LBS | BODY MASS INDEX: 30.73 KG/M2 | SYSTOLIC BLOOD PRESSURE: 107 MMHG

## 2023-01-24 LAB
BILIRUB UR QL STRIP: NORMAL
GLUCOSE UR-MCNC: NORMAL
HCG UR QL: 0.2 EU/DL
HGB UR QL STRIP.AUTO: NORMAL
KETONES UR-MCNC: NORMAL
LEUKOCYTE ESTERASE UR QL STRIP: NORMAL
NITRITE UR QL STRIP: NORMAL
PH UR STRIP: 7
PROT UR STRIP-MCNC: NORMAL
SP GR UR STRIP: 1.02

## 2023-01-24 PROCEDURE — 0501F PRENATAL FLOW SHEET: CPT | Mod: NC

## 2023-01-24 PROCEDURE — 90471 IMMUNIZATION ADMIN: CPT

## 2023-01-24 PROCEDURE — 81003 URINALYSIS AUTO W/O SCOPE: CPT | Mod: QW

## 2023-01-24 PROCEDURE — 90686 IIV4 VACC NO PRSV 0.5 ML IM: CPT

## 2023-01-24 PROCEDURE — 76801 OB US < 14 WKS SINGLE FETUS: CPT

## 2023-02-21 ENCOUNTER — APPOINTMENT (OUTPATIENT)
Dept: OBGYN | Facility: CLINIC | Age: 43
End: 2023-02-21
Payer: COMMERCIAL

## 2023-02-21 VITALS
WEIGHT: 179 LBS | DIASTOLIC BLOOD PRESSURE: 71 MMHG | SYSTOLIC BLOOD PRESSURE: 110 MMHG | HEIGHT: 62 IN | BODY MASS INDEX: 32.94 KG/M2

## 2023-02-21 LAB
BILIRUB UR QL STRIP: NORMAL
GLUCOSE UR-MCNC: NORMAL
HCG UR QL: 0.2 EU/DL
HGB UR QL STRIP.AUTO: NORMAL
KETONES UR-MCNC: NORMAL
LEUKOCYTE ESTERASE UR QL STRIP: NORMAL
NITRITE UR QL STRIP: NORMAL
PH UR STRIP: 7
PROT UR STRIP-MCNC: NORMAL
SP GR UR STRIP: >=1.03

## 2023-02-21 PROCEDURE — 0502F SUBSEQUENT PRENATAL CARE: CPT | Mod: NC

## 2023-02-21 PROCEDURE — 81003 URINALYSIS AUTO W/O SCOPE: CPT | Mod: QW

## 2023-03-13 NOTE — OB RN DELIVERY SUMMARY - BABY A: APGAR 5 MIN REFLEX IRRITABILITY, DELIVERY
Health Maintenance Due   Topic Date Due   • Hepatitis B Vaccine (1 of 3 - 3-dose series) Never done   • COVID-19 Vaccine (1) Never done   • Hepatitis C Screening  Never done   • Colorectal Cancer Screen-  Never done   • Shingles Vaccine (1 of 2) Never done   • Influenza Vaccine (1) 09/01/2022       Patient is due for topics as listed above but is not proceeding with Immunization(s) COVID-19, Hep B, Influenza and Shingles at this time.    (2) cough or sneeze

## 2023-03-30 ENCOUNTER — APPOINTMENT (OUTPATIENT)
Dept: OBGYN | Facility: CLINIC | Age: 43
End: 2023-03-30
Payer: COMMERCIAL

## 2023-03-30 ENCOUNTER — APPOINTMENT (OUTPATIENT)
Dept: ANTEPARTUM | Facility: CLINIC | Age: 43
End: 2023-03-30
Payer: COMMERCIAL

## 2023-03-30 ENCOUNTER — ASOB RESULT (OUTPATIENT)
Age: 43
End: 2023-03-30

## 2023-03-30 VITALS
HEIGHT: 62 IN | SYSTOLIC BLOOD PRESSURE: 99 MMHG | DIASTOLIC BLOOD PRESSURE: 67 MMHG | WEIGHT: 176 LBS | BODY MASS INDEX: 32.39 KG/M2

## 2023-03-30 LAB
BILIRUB UR QL STRIP: NORMAL
GLUCOSE UR-MCNC: NORMAL
HCG UR QL: 0.2 EU/DL
HGB UR QL STRIP.AUTO: NORMAL
KETONES UR-MCNC: NORMAL
LEUKOCYTE ESTERASE UR QL STRIP: NORMAL
NITRITE UR QL STRIP: NORMAL
PH UR STRIP: 6
PROT UR STRIP-MCNC: NORMAL
SP GR UR STRIP: 1.01

## 2023-03-30 PROCEDURE — 76811 OB US DETAILED SNGL FETUS: CPT

## 2023-03-30 PROCEDURE — 0502F SUBSEQUENT PRENATAL CARE: CPT | Mod: NC

## 2023-03-30 PROCEDURE — 81003 URINALYSIS AUTO W/O SCOPE: CPT | Mod: QW

## 2023-04-18 ENCOUNTER — APPOINTMENT (OUTPATIENT)
Dept: PEDIATRIC CARDIOLOGY | Facility: CLINIC | Age: 43
End: 2023-04-18
Payer: COMMERCIAL

## 2023-04-18 PROCEDURE — 76821 MIDDLE CEREBRAL ARTERY ECHO: CPT

## 2023-04-18 PROCEDURE — 99203 OFFICE O/P NEW LOW 30 MIN: CPT | Mod: 25

## 2023-04-18 PROCEDURE — 93325 DOPPLER ECHO COLOR FLOW MAPG: CPT | Mod: 59

## 2023-04-18 PROCEDURE — 76820 UMBILICAL ARTERY ECHO: CPT

## 2023-04-18 PROCEDURE — 76827 ECHO EXAM OF FETAL HEART: CPT

## 2023-04-18 PROCEDURE — 76825 ECHO EXAM OF FETAL HEART: CPT

## 2023-04-25 ENCOUNTER — APPOINTMENT (OUTPATIENT)
Dept: OBGYN | Facility: CLINIC | Age: 43
End: 2023-04-25

## 2023-05-04 ENCOUNTER — APPOINTMENT (OUTPATIENT)
Dept: OBGYN | Facility: CLINIC | Age: 43
End: 2023-05-04
Payer: COMMERCIAL

## 2023-05-04 VITALS
HEIGHT: 62 IN | BODY MASS INDEX: 33.13 KG/M2 | SYSTOLIC BLOOD PRESSURE: 105 MMHG | DIASTOLIC BLOOD PRESSURE: 73 MMHG | WEIGHT: 180 LBS

## 2023-05-04 LAB
BILIRUB UR QL STRIP: NORMAL
GLUCOSE UR-MCNC: NORMAL
HCG UR QL: 0.2 EU/DL
HGB UR QL STRIP.AUTO: NORMAL
KETONES UR-MCNC: NORMAL
LEUKOCYTE ESTERASE UR QL STRIP: NORMAL
NITRITE UR QL STRIP: NORMAL
PH UR STRIP: 5.5
PROT UR STRIP-MCNC: NORMAL
SP GR UR STRIP: 1.02

## 2023-05-04 PROCEDURE — 81003 URINALYSIS AUTO W/O SCOPE: CPT | Mod: QW

## 2023-05-04 PROCEDURE — 0502F SUBSEQUENT PRENATAL CARE: CPT | Mod: NC

## 2023-05-05 LAB
BASOPHILS # BLD AUTO: 0.05 K/UL
BASOPHILS NFR BLD AUTO: 0.8 %
EOSINOPHIL # BLD AUTO: 0.04 K/UL
EOSINOPHIL NFR BLD AUTO: 0.7 %
GLUCOSE 1H P 50 G GLC PO SERPL-MCNC: 83 MG/DL
HCT VFR BLD CALC: 36.9 %
HGB BLD-MCNC: 11.3 G/DL
IMM GRANULOCYTES NFR BLD AUTO: 0.7 %
LYMPHOCYTES # BLD AUTO: 1.39 K/UL
LYMPHOCYTES NFR BLD AUTO: 23.3 %
MAN DIFF?: NORMAL
MCHC RBC-ENTMCNC: 29 PG
MCHC RBC-ENTMCNC: 30.6 GM/DL
MCV RBC AUTO: 94.6 FL
MONOCYTES # BLD AUTO: 0.47 K/UL
MONOCYTES NFR BLD AUTO: 7.9 %
NEUTROPHILS # BLD AUTO: 3.97 K/UL
NEUTROPHILS NFR BLD AUTO: 66.6 %
PLATELET # BLD AUTO: 224 K/UL
RBC # BLD: 3.9 M/UL
RBC # FLD: 14.4 %
WBC # FLD AUTO: 5.96 K/UL

## 2023-05-11 ENCOUNTER — APPOINTMENT (OUTPATIENT)
Dept: ANTEPARTUM | Facility: CLINIC | Age: 43
End: 2023-05-11
Payer: COMMERCIAL

## 2023-05-11 ENCOUNTER — ASOB RESULT (OUTPATIENT)
Age: 43
End: 2023-05-11

## 2023-05-11 PROCEDURE — 76816 OB US FOLLOW-UP PER FETUS: CPT

## 2023-05-23 ENCOUNTER — APPOINTMENT (OUTPATIENT)
Dept: OBGYN | Facility: CLINIC | Age: 43
End: 2023-05-23
Payer: COMMERCIAL

## 2023-05-23 VITALS — DIASTOLIC BLOOD PRESSURE: 73 MMHG | WEIGHT: 180 LBS | SYSTOLIC BLOOD PRESSURE: 107 MMHG | BODY MASS INDEX: 32.92 KG/M2

## 2023-05-23 LAB
BILIRUB UR QL STRIP: NORMAL
GLUCOSE UR-MCNC: NORMAL
HCG UR QL: 0.2 EU/DL
HGB UR QL STRIP.AUTO: NORMAL
KETONES UR-MCNC: NORMAL
LEUKOCYTE ESTERASE UR QL STRIP: NORMAL
NITRITE UR QL STRIP: NORMAL
PH UR STRIP: 8
PROT UR STRIP-MCNC: NORMAL
SP GR UR STRIP: 1.01

## 2023-05-23 PROCEDURE — 76816 OB US FOLLOW-UP PER FETUS: CPT

## 2023-05-23 PROCEDURE — 0502F SUBSEQUENT PRENATAL CARE: CPT

## 2023-05-29 ENCOUNTER — NON-APPOINTMENT (OUTPATIENT)
Age: 43
End: 2023-05-29

## 2023-06-20 ENCOUNTER — ASOB RESULT (OUTPATIENT)
Age: 43
End: 2023-06-20

## 2023-06-20 ENCOUNTER — APPOINTMENT (OUTPATIENT)
Dept: OBGYN | Facility: CLINIC | Age: 43
End: 2023-06-20
Payer: COMMERCIAL

## 2023-06-20 ENCOUNTER — APPOINTMENT (OUTPATIENT)
Dept: ANTEPARTUM | Facility: CLINIC | Age: 43
End: 2023-06-20
Payer: COMMERCIAL

## 2023-06-20 VITALS — SYSTOLIC BLOOD PRESSURE: 91 MMHG | WEIGHT: 182 LBS | DIASTOLIC BLOOD PRESSURE: 60 MMHG | BODY MASS INDEX: 33.29 KG/M2

## 2023-06-20 LAB
BILIRUB UR QL STRIP: NORMAL
GLUCOSE UR-MCNC: NORMAL
HCG UR QL: 0.2 EU/DL
HGB UR QL STRIP.AUTO: NORMAL
KETONES UR-MCNC: NORMAL
LEUKOCYTE ESTERASE UR QL STRIP: NORMAL
NITRITE UR QL STRIP: NORMAL
PH UR STRIP: 7
PROT UR STRIP-MCNC: NORMAL
SP GR UR STRIP: 1.01

## 2023-06-20 PROCEDURE — 76816 OB US FOLLOW-UP PER FETUS: CPT

## 2023-06-20 PROCEDURE — 0502F SUBSEQUENT PRENATAL CARE: CPT

## 2023-06-21 LAB
ALBUMIN SERPL ELPH-MCNC: 3.8 G/DL
ALP BLD-CCNC: 93 U/L
ALT SERPL-CCNC: 13 U/L
ANION GAP SERPL CALC-SCNC: 12 MMOL/L
AST SERPL-CCNC: 17 U/L
BILIRUB SERPL-MCNC: 0.3 MG/DL
BUN SERPL-MCNC: 7 MG/DL
CALCIUM SERPL-MCNC: 9.8 MG/DL
CHLORIDE SERPL-SCNC: 103 MMOL/L
CO2 SERPL-SCNC: 23 MMOL/L
CREAT SERPL-MCNC: 0.48 MG/DL
EGFR: 122 ML/MIN/1.73M2
GLUCOSE SERPL-MCNC: 78 MG/DL
POTASSIUM SERPL-SCNC: 4.7 MMOL/L
PROT SERPL-MCNC: 6.4 G/DL
SODIUM SERPL-SCNC: 138 MMOL/L
TSH SERPL-ACNC: 0.35 UIU/ML

## 2023-06-26 LAB
BILEACIDS T: 2.8 UMOL/L
CHENDEOXYCHOLIC ACID: 1.5 UMOL/L
CHOLIC ACID: 0.5 UMOL/L
DEOXYCHOLIC ACID: 0.8 UMOL/L
URSODEOXYCH: <0.1 UMOL/L

## 2023-07-11 ENCOUNTER — NON-APPOINTMENT (OUTPATIENT)
Age: 43
End: 2023-07-11

## 2023-07-11 ENCOUNTER — APPOINTMENT (OUTPATIENT)
Dept: OBGYN | Facility: CLINIC | Age: 43
End: 2023-07-11
Payer: COMMERCIAL

## 2023-07-11 VITALS
WEIGHT: 186 LBS | BODY MASS INDEX: 34.23 KG/M2 | HEIGHT: 62 IN | SYSTOLIC BLOOD PRESSURE: 96 MMHG | DIASTOLIC BLOOD PRESSURE: 65 MMHG

## 2023-07-11 DIAGNOSIS — O26.843 UTERINE SIZE-DATE DISCREPANCY, THIRD TRIMESTER: ICD-10-CM

## 2023-07-11 LAB
BILIRUB UR QL STRIP: NORMAL
GLUCOSE UR-MCNC: NORMAL
HCG UR QL: 0.2 EU/DL
HGB UR QL STRIP.AUTO: NORMAL
KETONES UR-MCNC: NORMAL
LEUKOCYTE ESTERASE UR QL STRIP: NORMAL
NITRITE UR QL STRIP: NORMAL
PH UR STRIP: 8.5
PROT UR STRIP-MCNC: NORMAL
SP GR UR STRIP: 1.01

## 2023-07-11 PROCEDURE — 90471 IMMUNIZATION ADMIN: CPT

## 2023-07-11 PROCEDURE — 90715 TDAP VACCINE 7 YRS/> IM: CPT

## 2023-07-11 PROCEDURE — 76816 OB US FOLLOW-UP PER FETUS: CPT

## 2023-07-11 PROCEDURE — 0502F SUBSEQUENT PRENATAL CARE: CPT

## 2023-07-23 ENCOUNTER — NON-APPOINTMENT (OUTPATIENT)
Age: 43
End: 2023-07-23

## 2023-07-24 ENCOUNTER — APPOINTMENT (OUTPATIENT)
Dept: OBGYN | Facility: CLINIC | Age: 43
End: 2023-07-24
Payer: COMMERCIAL

## 2023-07-24 VITALS
BODY MASS INDEX: 34.6 KG/M2 | DIASTOLIC BLOOD PRESSURE: 68 MMHG | HEIGHT: 62 IN | WEIGHT: 188 LBS | SYSTOLIC BLOOD PRESSURE: 102 MMHG

## 2023-07-24 PROCEDURE — 76816 OB US FOLLOW-UP PER FETUS: CPT

## 2023-07-24 PROCEDURE — 0502F SUBSEQUENT PRENATAL CARE: CPT

## 2023-07-24 PROCEDURE — 76819 FETAL BIOPHYS PROFIL W/O NST: CPT | Mod: 59

## 2023-07-25 LAB
HCT VFR BLD CALC: 38.6 %
HGB BLD-MCNC: 11.4 G/DL
HIV1+2 AB SPEC QL IA.RAPID: NONREACTIVE
MCHC RBC-ENTMCNC: 27.6 PG
MCHC RBC-ENTMCNC: 29.5 GM/DL
MCV RBC AUTO: 93.5 FL
PLATELET # BLD AUTO: 255 K/UL
RBC # BLD: 4.13 M/UL
RBC # FLD: 16.1 %
T PALLIDUM AB SER QL IA: NEGATIVE
WBC # FLD AUTO: 10.58 K/UL

## 2023-07-27 LAB — B-HEM STREP SPEC QL CULT: NORMAL

## 2023-08-03 ENCOUNTER — APPOINTMENT (OUTPATIENT)
Dept: OBGYN | Facility: CLINIC | Age: 43
End: 2023-08-03
Payer: COMMERCIAL

## 2023-08-03 ENCOUNTER — INPATIENT (INPATIENT)
Facility: HOSPITAL | Age: 43
LOS: 2 days | Discharge: HOME | DRG: 560 | End: 2023-08-06
Attending: STUDENT IN AN ORGANIZED HEALTH CARE EDUCATION/TRAINING PROGRAM | Admitting: STUDENT IN AN ORGANIZED HEALTH CARE EDUCATION/TRAINING PROGRAM
Payer: COMMERCIAL

## 2023-08-03 VITALS
SYSTOLIC BLOOD PRESSURE: 121 MMHG | TEMPERATURE: 99 F | DIASTOLIC BLOOD PRESSURE: 58 MMHG | HEART RATE: 65 BPM | HEIGHT: 62 IN | WEIGHT: 186.07 LBS | RESPIRATION RATE: 18 BRPM

## 2023-08-03 VITALS — BODY MASS INDEX: 34.39 KG/M2 | WEIGHT: 188 LBS | DIASTOLIC BLOOD PRESSURE: 75 MMHG | SYSTOLIC BLOOD PRESSURE: 109 MMHG

## 2023-08-03 DIAGNOSIS — O42.92 FULL-TERM PREMATURE RUPTURE OF MEMBRANES, UNSPECIFIED AS TO LENGTH OF TIME BETWEEN RUPTURE AND ONSET OF LABOR: ICD-10-CM

## 2023-08-03 DIAGNOSIS — Z34.90 ENCOUNTER FOR SUPERVISION OF NORMAL PREGNANCY, UNSPECIFIED, UNSPECIFIED TRIMESTER: ICD-10-CM

## 2023-08-03 LAB
APPEARANCE UR: ABNORMAL
BASOPHILS # BLD AUTO: 0.02 K/UL — SIGNIFICANT CHANGE UP (ref 0–0.2)
BASOPHILS NFR BLD AUTO: 0.2 % — SIGNIFICANT CHANGE UP (ref 0–1)
BILIRUB UR QL STRIP: NORMAL
BILIRUB UR-MCNC: NEGATIVE — SIGNIFICANT CHANGE UP
BLD GP AB SCN SERPL QL: SIGNIFICANT CHANGE UP
COLOR SPEC: YELLOW — SIGNIFICANT CHANGE UP
DIFF PNL FLD: ABNORMAL
EOSINOPHIL # BLD AUTO: 0.02 K/UL — SIGNIFICANT CHANGE UP (ref 0–0.7)
EOSINOPHIL NFR BLD AUTO: 0.2 % — SIGNIFICANT CHANGE UP (ref 0–8)
GLUCOSE UR QL: NEGATIVE MG/DL — SIGNIFICANT CHANGE UP
GLUCOSE UR-MCNC: NORMAL
HCG UR QL: 1 EU/DL
HCT VFR BLD CALC: 39.7 % — SIGNIFICANT CHANGE UP (ref 37–47)
HGB BLD-MCNC: 13.2 G/DL — SIGNIFICANT CHANGE UP (ref 12–16)
HGB UR QL STRIP.AUTO: NORMAL
IMM GRANULOCYTES NFR BLD AUTO: 0.4 % — HIGH (ref 0.1–0.3)
KETONES UR-MCNC: 15 MG/DL
KETONES UR-MCNC: NORMAL
LEUKOCYTE ESTERASE UR QL STRIP: NORMAL
LEUKOCYTE ESTERASE UR-ACNC: ABNORMAL
LYMPHOCYTES # BLD AUTO: 2.59 K/UL — SIGNIFICANT CHANGE UP (ref 1.2–3.4)
LYMPHOCYTES # BLD AUTO: 21.9 % — SIGNIFICANT CHANGE UP (ref 20.5–51.1)
MCHC RBC-ENTMCNC: 28.1 PG — SIGNIFICANT CHANGE UP (ref 27–31)
MCHC RBC-ENTMCNC: 33.2 G/DL — SIGNIFICANT CHANGE UP (ref 32–37)
MCV RBC AUTO: 84.5 FL — SIGNIFICANT CHANGE UP (ref 81–99)
MONOCYTES # BLD AUTO: 0.86 K/UL — HIGH (ref 0.1–0.6)
MONOCYTES NFR BLD AUTO: 7.3 % — SIGNIFICANT CHANGE UP (ref 1.7–9.3)
NEUTROPHILS # BLD AUTO: 8.29 K/UL — HIGH (ref 1.4–6.5)
NEUTROPHILS NFR BLD AUTO: 70 % — SIGNIFICANT CHANGE UP (ref 42.2–75.2)
NITRITE UR QL STRIP: NORMAL
NITRITE UR-MCNC: NEGATIVE — SIGNIFICANT CHANGE UP
NRBC # BLD: 0 /100 WBCS — SIGNIFICANT CHANGE UP (ref 0–0)
PH UR STRIP: 7
PH UR: 6.5 — SIGNIFICANT CHANGE UP (ref 5–8)
PLATELET # BLD AUTO: 302 K/UL — SIGNIFICANT CHANGE UP (ref 130–400)
PMV BLD: 11 FL — HIGH (ref 7.4–10.4)
PRENATAL SYPHILIS TEST: SIGNIFICANT CHANGE UP
PROT UR STRIP-MCNC: NORMAL
PROT UR-MCNC: 30 MG/DL
RBC # BLD: 4.7 M/UL — SIGNIFICANT CHANGE UP (ref 4.2–5.4)
RBC # FLD: 15.4 % — HIGH (ref 11.5–14.5)
SP GR SPEC: 1.01 — SIGNIFICANT CHANGE UP (ref 1–1.03)
SP GR UR STRIP: 1.01
UROBILINOGEN FLD QL: 1 MG/DL — SIGNIFICANT CHANGE UP (ref 0.2–1)
WBC # BLD: 11.83 K/UL — HIGH (ref 4.8–10.8)
WBC # FLD AUTO: 11.83 K/UL — HIGH (ref 4.8–10.8)

## 2023-08-03 PROCEDURE — 86901 BLOOD TYPING SEROLOGIC RH(D): CPT

## 2023-08-03 PROCEDURE — 80354 DRUG SCREENING FENTANYL: CPT

## 2023-08-03 PROCEDURE — 76815 OB US LIMITED FETUS(S): CPT

## 2023-08-03 PROCEDURE — 59050 FETAL MONITOR W/REPORT: CPT

## 2023-08-03 PROCEDURE — 36415 COLL VENOUS BLD VENIPUNCTURE: CPT

## 2023-08-03 PROCEDURE — 59400 OBSTETRICAL CARE: CPT | Mod: U7

## 2023-08-03 PROCEDURE — 86850 RBC ANTIBODY SCREEN: CPT

## 2023-08-03 PROCEDURE — 86900 BLOOD TYPING SEROLOGIC ABO: CPT

## 2023-08-03 PROCEDURE — 81001 URINALYSIS AUTO W/SCOPE: CPT

## 2023-08-03 PROCEDURE — 81003 URINALYSIS AUTO W/O SCOPE: CPT | Mod: NC,QW

## 2023-08-03 PROCEDURE — 86592 SYPHILIS TEST NON-TREP QUAL: CPT

## 2023-08-03 PROCEDURE — 0502F SUBSEQUENT PRENATAL CARE: CPT

## 2023-08-03 PROCEDURE — 59025 FETAL NON-STRESS TEST: CPT | Mod: 59

## 2023-08-03 PROCEDURE — 85025 COMPLETE CBC W/AUTO DIFF WBC: CPT

## 2023-08-03 PROCEDURE — 80307 DRUG TEST PRSMV CHEM ANLYZR: CPT

## 2023-08-03 RX ORDER — BENZOCAINE 10 %
1 GEL (GRAM) MUCOUS MEMBRANE EVERY 6 HOURS
Refills: 0 | Status: DISCONTINUED | OUTPATIENT
Start: 2023-08-03 | End: 2023-08-06

## 2023-08-03 RX ORDER — ACETAMINOPHEN 500 MG
975 TABLET ORAL
Refills: 0 | Status: DISCONTINUED | OUTPATIENT
Start: 2023-08-03 | End: 2023-08-06

## 2023-08-03 RX ORDER — OXYCODONE HYDROCHLORIDE 5 MG/1
5 TABLET ORAL ONCE
Refills: 0 | Status: DISCONTINUED | OUTPATIENT
Start: 2023-08-03 | End: 2023-08-06

## 2023-08-03 RX ORDER — OXYCODONE HYDROCHLORIDE 5 MG/1
5 TABLET ORAL
Refills: 0 | Status: DISCONTINUED | OUTPATIENT
Start: 2023-08-03 | End: 2023-08-06

## 2023-08-03 RX ORDER — TETANUS TOXOID, REDUCED DIPHTHERIA TOXOID AND ACELLULAR PERTUSSIS VACCINE, ADSORBED 5; 2.5; 8; 8; 2.5 [IU]/.5ML; [IU]/.5ML; UG/.5ML; UG/.5ML; UG/.5ML
0.5 SUSPENSION INTRAMUSCULAR ONCE
Refills: 0 | Status: DISCONTINUED | OUTPATIENT
Start: 2023-08-03 | End: 2023-08-06

## 2023-08-03 RX ORDER — SIMETHICONE 80 MG/1
80 TABLET, CHEWABLE ORAL EVERY 4 HOURS
Refills: 0 | Status: DISCONTINUED | OUTPATIENT
Start: 2023-08-03 | End: 2023-08-06

## 2023-08-03 RX ORDER — MAGNESIUM HYDROXIDE 400 MG/1
30 TABLET, CHEWABLE ORAL
Refills: 0 | Status: DISCONTINUED | OUTPATIENT
Start: 2023-08-03 | End: 2023-08-06

## 2023-08-03 RX ORDER — DIPHENHYDRAMINE HCL 50 MG
25 CAPSULE ORAL EVERY 6 HOURS
Refills: 0 | Status: DISCONTINUED | OUTPATIENT
Start: 2023-08-03 | End: 2023-08-06

## 2023-08-03 RX ORDER — SODIUM CHLORIDE 9 MG/ML
3 INJECTION INTRAMUSCULAR; INTRAVENOUS; SUBCUTANEOUS EVERY 8 HOURS
Refills: 0 | Status: DISCONTINUED | OUTPATIENT
Start: 2023-08-03 | End: 2023-08-06

## 2023-08-03 RX ORDER — AER TRAVELER 0.5 G/1
1 SOLUTION RECTAL; TOPICAL EVERY 4 HOURS
Refills: 0 | Status: DISCONTINUED | OUTPATIENT
Start: 2023-08-03 | End: 2023-08-06

## 2023-08-03 RX ORDER — IBUPROFEN 200 MG
600 TABLET ORAL EVERY 6 HOURS
Refills: 0 | Status: COMPLETED | OUTPATIENT
Start: 2023-08-03 | End: 2024-07-01

## 2023-08-03 RX ORDER — KETOROLAC TROMETHAMINE 30 MG/ML
30 SYRINGE (ML) INJECTION ONCE
Refills: 0 | Status: DISCONTINUED | OUTPATIENT
Start: 2023-08-03 | End: 2023-08-03

## 2023-08-03 RX ORDER — SODIUM CHLORIDE 9 MG/ML
1000 INJECTION, SOLUTION INTRAVENOUS
Refills: 0 | Status: DISCONTINUED | OUTPATIENT
Start: 2023-08-03 | End: 2023-08-03

## 2023-08-03 RX ORDER — LANOLIN
1 OINTMENT (GRAM) TOPICAL EVERY 6 HOURS
Refills: 0 | Status: DISCONTINUED | OUTPATIENT
Start: 2023-08-03 | End: 2023-08-06

## 2023-08-03 RX ORDER — DIBUCAINE 1 %
1 OINTMENT (GRAM) RECTAL EVERY 6 HOURS
Refills: 0 | Status: DISCONTINUED | OUTPATIENT
Start: 2023-08-03 | End: 2023-08-06

## 2023-08-03 RX ORDER — OXYTOCIN 10 UNIT/ML
333.33 VIAL (ML) INJECTION
Qty: 20 | Refills: 0 | Status: DISCONTINUED | OUTPATIENT
Start: 2023-08-03 | End: 2023-08-04

## 2023-08-03 RX ADMIN — Medication 1000 MILLIUNIT(S)/MIN: at 22:22

## 2023-08-03 RX ADMIN — Medication 30 MILLIGRAM(S): at 23:04

## 2023-08-03 RX ADMIN — Medication 30 MILLIGRAM(S): at 22:22

## 2023-08-03 NOTE — OB PROVIDER H&P - HISTORY OF PRESENT ILLNESS
44yo  at 38w4d JAMES 23 dated by LMP, consistent with 1st trimester US, presents to L&D precipitously with contractions and SROM. Denies VB and confirms good FM. GBS negative.  42yo  at 38w4d JAMES 23 dated by LMP, consistent with 1st trimester US, presents to L&D precipitously with contractions and SROM. Denies VB and confirms good FM. GBS negative.

## 2023-08-03 NOTE — OB RN DELIVERY SUMMARY - NSSELHIDDEN_OBGYN_ALL_OB_FT
[NS_DeliveryAttending1_OBGYN_ALL_OB_FT:[email or web address]=],[NS_DeliveryAssist1_OBGYN_ALL_OB_FT:[id number]=],[NS_CirculateRN2_OBGYN_ALL_OB_FT:[id number]=]

## 2023-08-03 NOTE — OB PROVIDER DELIVERY SUMMARY - NSSELHIDDEN_OBGYN_ALL_OB_FT
[NS_DeliveryAttending1_OBGYN_ALL_OB_FT:[email or web address]=],[NS_DeliveryAssist1_OBGYN_ALL_OB_FT:[id number]=],[NS_CirculateRN2_OBGYN_ALL_OB_FT:[id number]=]

## 2023-08-03 NOTE — OB PROVIDER H&P - NSLOWPPHRISK_OBGYN_A_OB
No previous uterine incision/De Jesus Pregnancy/Less than or equal to 4 previous vaginal births/No known bleeding disorder/No history of postpartum hemorrhage/No other PPH risks indicated

## 2023-08-03 NOTE — OB PROVIDER DELIVERY SUMMARY - NSLOWPPHRISK_OBGYN_A_OB
No previous uterine incision/De Jesus Pregnancy/No known bleeding disorder/No history of postpartum hemorrhage

## 2023-08-03 NOTE — OB PROVIDER H&P - NSHPLABSRESULTS_GEN_ALL_CORE
7/24  GBS negative  RPR negative  HIV negative  1/5  Antibody screen negative  Blood type B Positive  measles, rubella immune  Mumps equivocal  varicella immune  Hep B NR  5/24  GCT 83  6/20   bile acids wnl    [place]:  32w2d: EFW 2281. AC 93%, MVP 5.26, posterior placenta  26w4d: EFW 1090, AC 86%, anatomy nl, cervic length 4.61, MVP 4.9, posterior placenta  20w4d: EFW 389g, EMILY wnl, crvix 5.06, b/l ovaries nl

## 2023-08-03 NOTE — OB PROVIDER H&P - ASSESSMENT
43y  @ 38w4d, GBS negative, in labor.    -Admit to L & D  -IV hydration, labs  -Continuous EFM & TOCO monitoring  -Clear Liquid diet  -Pain Management PRN      Dr. Pride and Dr. Barnhart aware.

## 2023-08-03 NOTE — OB PROVIDER H&P - NSHPPHYSICALEXAM_GEN_ALL_CORE
Physical Exam  Vital Signs Last 24 Hrs  T(F): 99.3 (03 Aug 2023 21:42), Max: 99.3 (03 Aug 2023 21:42)  HR: 66 (03 Aug 2023 22:27) (65 - 97)  BP: 117/55 (03 Aug 2023 22:27) (111/61 - 121/58)  RR: 18 (03 Aug 2023 21:42) (18 - 18)    Gen: NAD, AOx3  Abdomen: soft, gravid, nontender, no palpable contractions    EFM: +FHR, unable to trace due to imminent delivery   SVE: 8/100/+1

## 2023-08-03 NOTE — OB PROVIDER H&P - NS_OBGYNHISTORY_OBGYN_ALL_OB_FT
OB Hx:   04, [id number], 2767, female, FT   05, 39w, 3311, male,   2007, 39w, 2780g, female,   09, 39w, 2785g, female,   10/5/12, 39w, 3683, male, vaginal,   3/20/17, 39w, 3239, female,   3/15/19, 3680g, female,     Gyn Hx: Denies cysts, fibroids, abnormal pap smears or STDs OB Hx:   04, [id number], 2767, female, FT   05, 39w, 3311, male,   2007, 39w, 2780g, female,   09, 39w, 2785g, female,   10/5/12, 39w, 3683, male, vaginal,   3/20/17, 39w, 3239, female,   3/15/19, 3680g, female,     Gyn Hx: Denies cysts, fibroids, abnormal pap smears or STDs OB Hx:   04, [id number], 2767, female, FT   05, 39w, 3311, male,   2007, 39w, 2780g, female,   09, 39w, 2785g, female,   10/5/12, 39w, 3683, male, vaginal,   3/20/17, 39w, 3239, female,   3/15/19, 3680g, female,     Gyn Hx: Denies cysts, fibroids, abnormal pap smears or STDs

## 2023-08-03 NOTE — OB PROVIDER H&P - NSDOBORLOSS1_OBGYN_ALL_OB_DT
no loss of consciousness, no gait abnormality, no headache, no sensory deficits, and no weakness. 12-Jul-2002

## 2023-08-03 NOTE — OB PROVIDER DELIVERY SUMMARY - NSPROVIDERDELIVERYNOTE_OBGYN_ALL_OB_FT
Patient was fully dilated and pushing. Fetal head was OA and restituted to ROT. The anterior and posterior shoulders delivered, followed by the remaining body atraumatically. Delayed cord clamping was performed, and then clamped and cut. Cord blood gases collected x2. The  was handed to the mother and RN. The placenta delivered intact with membranes. Pitocin was administered. Uterus massaged, fundus found to be firm. Cervix, vagina and perineum inspected, no lacerations noted.

## 2023-08-04 LAB
AMPHET UR-MCNC: NEGATIVE — SIGNIFICANT CHANGE UP
BACTERIA # UR AUTO: ABNORMAL /HPF
BARBITURATES UR SCN-MCNC: NEGATIVE — SIGNIFICANT CHANGE UP
BASOPHILS # BLD AUTO: 0.03 K/UL — SIGNIFICANT CHANGE UP (ref 0–0.2)
BASOPHILS NFR BLD AUTO: 0.3 % — SIGNIFICANT CHANGE UP (ref 0–1)
BENZODIAZ UR-MCNC: NEGATIVE — SIGNIFICANT CHANGE UP
BUPRENORPHINE SCREEN, URINE RESULT: NEGATIVE — SIGNIFICANT CHANGE UP
CAST: SIGNIFICANT CHANGE UP /LPF
COCAINE METAB.OTHER UR-MCNC: NEGATIVE — SIGNIFICANT CHANGE UP
EOSINOPHIL # BLD AUTO: 0.06 K/UL — SIGNIFICANT CHANGE UP (ref 0–0.7)
EOSINOPHIL NFR BLD AUTO: 0.5 % — SIGNIFICANT CHANGE UP (ref 0–8)
FENTANYL UR QL: NEGATIVE — SIGNIFICANT CHANGE UP
HCT VFR BLD CALC: 37.4 % — SIGNIFICANT CHANGE UP (ref 37–47)
HGB BLD-MCNC: 12.2 G/DL — SIGNIFICANT CHANGE UP (ref 12–16)
HYALINE CASTS # UR AUTO: 2 /LPF — SIGNIFICANT CHANGE UP (ref 0–4)
IMM GRANULOCYTES NFR BLD AUTO: 0.7 % — HIGH (ref 0.1–0.3)
L&D DRUG SCREEN, URINE: SIGNIFICANT CHANGE UP
LYMPHOCYTES # BLD AUTO: 1.92 K/UL — SIGNIFICANT CHANGE UP (ref 1.2–3.4)
LYMPHOCYTES # BLD AUTO: 16.5 % — LOW (ref 20.5–51.1)
MCHC RBC-ENTMCNC: 27.4 PG — SIGNIFICANT CHANGE UP (ref 27–31)
MCHC RBC-ENTMCNC: 32.6 G/DL — SIGNIFICANT CHANGE UP (ref 32–37)
MCV RBC AUTO: 84 FL — SIGNIFICANT CHANGE UP (ref 81–99)
METHADONE UR-MCNC: NEGATIVE — SIGNIFICANT CHANGE UP
MONOCYTES # BLD AUTO: 1.05 K/UL — HIGH (ref 0.1–0.6)
MONOCYTES NFR BLD AUTO: 9 % — SIGNIFICANT CHANGE UP (ref 1.7–9.3)
NEUTROPHILS # BLD AUTO: 8.49 K/UL — HIGH (ref 1.4–6.5)
NEUTROPHILS NFR BLD AUTO: 73 % — SIGNIFICANT CHANGE UP (ref 42.2–75.2)
NRBC # BLD: 0 /100 WBCS — SIGNIFICANT CHANGE UP (ref 0–0)
OPIATES UR-MCNC: NEGATIVE — SIGNIFICANT CHANGE UP
OXYCODONE UR-MCNC: NEGATIVE — SIGNIFICANT CHANGE UP
PCP UR-MCNC: NEGATIVE — SIGNIFICANT CHANGE UP
PLATELET # BLD AUTO: 289 K/UL — SIGNIFICANT CHANGE UP (ref 130–400)
PMV BLD: 10.8 FL — HIGH (ref 7.4–10.4)
PROPOXYPHENE QUALITATIVE URINE RESULT: NEGATIVE — SIGNIFICANT CHANGE UP
RBC # BLD: 4.45 M/UL — SIGNIFICANT CHANGE UP (ref 4.2–5.4)
RBC # FLD: 15.4 % — HIGH (ref 11.5–14.5)
RBC CASTS # UR COMP ASSIST: 60 /HPF — HIGH (ref 0–4)
SQUAMOUS # UR AUTO: 8 /HPF — HIGH (ref 0–5)
WBC # BLD: 11.63 K/UL — HIGH (ref 4.8–10.8)
WBC # FLD AUTO: 11.63 K/UL — HIGH (ref 4.8–10.8)
WBC UR QL: 80 /HPF — HIGH (ref 0–5)

## 2023-08-04 RX ORDER — IBUPROFEN 200 MG
600 TABLET ORAL EVERY 6 HOURS
Refills: 0 | Status: DISCONTINUED | OUTPATIENT
Start: 2023-08-04 | End: 2023-08-06

## 2023-08-04 RX ADMIN — SODIUM CHLORIDE 3 MILLILITER(S): 9 INJECTION INTRAMUSCULAR; INTRAVENOUS; SUBCUTANEOUS at 13:23

## 2023-08-04 RX ADMIN — Medication 1 TABLET(S): at 11:05

## 2023-08-04 RX ADMIN — Medication 600 MILLIGRAM(S): at 17:31

## 2023-08-04 RX ADMIN — Medication 600 MILLIGRAM(S): at 18:30

## 2023-08-04 RX ADMIN — Medication 600 MILLIGRAM(S): at 06:13

## 2023-08-04 NOTE — PROGRESS NOTE ADULT - SUBJECTIVE AND OBJECTIVE BOX
Patient seen resting comfortably. No acute events overnight, no current complaints. Pain controlled with motrin. She reports lochia as minimal, no fevers, chills, nausea, vomiting, SOB, palpitations, dizziness. Patient is ambulating, tolerating diet, voiding freely without issue. Breastfeeding without difficulty.     Exam:  Gen: AAOx3  Breast: no engorgement, erythema, or tenderness  Abd: soft, non-tender, non-distended, uterus firm and to umbilicus  Ext: non-tender LE bilaterally    A: s/p NSD PPD#1, stable    Plan  - continue routine pp care  - encourage ambulating and breastfeeding  - motrin for pain  - f/u am cbc, fe if acute blood loss anemia

## 2023-08-05 ENCOUNTER — TRANSCRIPTION ENCOUNTER (OUTPATIENT)
Age: 43
End: 2023-08-05

## 2023-08-05 VITALS
DIASTOLIC BLOOD PRESSURE: 55 MMHG | HEART RATE: 67 BPM | RESPIRATION RATE: 18 BRPM | SYSTOLIC BLOOD PRESSURE: 110 MMHG | TEMPERATURE: 99 F

## 2023-08-05 RX ADMIN — SODIUM CHLORIDE 3 MILLILITER(S): 9 INJECTION INTRAMUSCULAR; INTRAVENOUS; SUBCUTANEOUS at 00:24

## 2023-08-05 RX ADMIN — Medication 600 MILLIGRAM(S): at 06:32

## 2023-08-05 RX ADMIN — SODIUM CHLORIDE 3 MILLILITER(S): 9 INJECTION INTRAMUSCULAR; INTRAVENOUS; SUBCUTANEOUS at 06:47

## 2023-08-05 RX ADMIN — Medication 600 MILLIGRAM(S): at 06:47

## 2023-08-05 RX ADMIN — SODIUM CHLORIDE 3 MILLILITER(S): 9 INJECTION INTRAMUSCULAR; INTRAVENOUS; SUBCUTANEOUS at 21:46

## 2023-08-05 RX ADMIN — SODIUM CHLORIDE 3 MILLILITER(S): 9 INJECTION INTRAMUSCULAR; INTRAVENOUS; SUBCUTANEOUS at 13:44

## 2023-08-05 NOTE — DISCHARGE NOTE OB - CARE PLAN
1 Principal Discharge DX:	Normal vaginal delivery  Assessment and plan of treatment:	s/p precipitous normal spontaneous vaginal delivery

## 2023-08-05 NOTE — DISCHARGE NOTE OB - PATIENT PORTAL LINK FT
You can access the FollowMyHealth Patient Portal offered by [facility] by registering at the following website: [email or web address]. By joining [employer]’s FollowMyHealth portal, you will also be able to view your health information using other applications (apps) compatible with our system.

## 2023-08-05 NOTE — DISCHARGE NOTE OB - CARE PROVIDER_API CALL
Natalio Russo  Obstetrics and Gynecology  [address] [place] [address]  Phone: [phone]  Fax: [phone]  Established Patient  Follow Up Time:

## 2023-08-05 NOTE — PROGRESS NOTE ADULT - SUBJECTIVE AND OBJECTIVE BOX
Subjective:   Patient doing well. No complaints. Minimal lochia. Pain controlled.    Objective:   T(F): 97.8 (- @ 23:47), Max: 99.1 (08- @ 15:35)  HR: 75 ( @ 23:47)  BP: 85/50 (08- @ 23:47) (85/50 - 105/53)  RR: 18 ( @ 23:47)  SpO2: --  Gen: AAOx3, NAD  Abd: Soft, Nontender, Nondistended, Fundus firm below the umbilicus  Ext: no tender, mild edema  Min Lochia Rubra    Labs:                        12.2   11.63 )-----------( 289      ( 04 Aug 2023 16:13 )             37.4             Tolerating regular diet  Passed flatus, passed bowel movement  Breast/Bottle feeding    Assessment:   43y s/p , PPD#1, doing well    Plan:  -Routine postpartum care  -Encouraged ambulation and PO hydration  -Tolerating regular diet

## 2023-08-05 NOTE — DISCHARGE NOTE OB - NS MD DC FALL RISK RISK
For information on Fall & Injury Prevention, visit: [email or web address] OR  [email or web address] OR  https://www.cdc.gov/steadi/patient.html

## 2023-08-09 DIAGNOSIS — Z3A.38 38 WEEKS GESTATION OF PREGNANCY: ICD-10-CM

## 2023-08-09 DIAGNOSIS — Z28.09 IMMUNIZATION NOT CARRIED OUT BECAUSE OF OTHER CONTRAINDICATION: ICD-10-CM

## 2023-08-09 DIAGNOSIS — Z28.21 IMMUNIZATION NOT CARRIED OUT BECAUSE OF PATIENT REFUSAL: ICD-10-CM

## 2023-09-07 ENCOUNTER — NON-APPOINTMENT (OUTPATIENT)
Age: 43
End: 2023-09-07

## 2023-09-11 ENCOUNTER — APPOINTMENT (OUTPATIENT)
Dept: OBGYN | Facility: CLINIC | Age: 43
End: 2023-09-11
Payer: COMMERCIAL

## 2023-09-11 VITALS — SYSTOLIC BLOOD PRESSURE: 101 MMHG | WEIGHT: 170 LBS | DIASTOLIC BLOOD PRESSURE: 70 MMHG | BODY MASS INDEX: 31.09 KG/M2

## 2023-09-11 PROCEDURE — 0503F POSTPARTUM CARE VISIT: CPT

## 2023-09-11 PROCEDURE — 81003 URINALYSIS AUTO W/O SCOPE: CPT | Mod: QW

## 2023-09-11 RX ORDER — NORETHINDRONE 0.35 MG/1
0.35 TABLET ORAL
Qty: 1 | Refills: 5 | Status: ACTIVE | COMMUNITY
Start: 2023-09-11 | End: 1900-01-01

## 2023-09-13 LAB
BACTERIA UR CULT: NORMAL
BILIRUB UR QL STRIP: NORMAL
C TRACH RRNA SPEC QL NAA+PROBE: NOT DETECTED
GLUCOSE UR-MCNC: NORMAL
HCG UR QL: 0.2 EU/DL
HGB UR QL STRIP.AUTO: NORMAL
KETONES UR-MCNC: NORMAL
LEUKOCYTE ESTERASE UR QL STRIP: NORMAL
N GONORRHOEA RRNA SPEC QL NAA+PROBE: NOT DETECTED
NITRITE UR QL STRIP: NORMAL
PH UR STRIP: 5.5
PROT UR STRIP-MCNC: NORMAL
SOURCE AMPLIFICATION: NORMAL
SP GR UR STRIP: 1.01

## 2023-10-13 NOTE — OB PROVIDER H&P - CLICK TO LAUNCH ORM
Advance Care Planning   Ambulatory ACP Specialist Patient Outreach    Date:  10/13/2023    ACP Specialist:  Gilberto Blair    Outreach call to patient in follow-up to ACP Specialist referral from:Renay Johnston APRN - NP    [x] PCP  [] Provider   [] Ambulatory Care Management [] Other     For:                  [x] Advance Directive Assistance              [] Complete Portable DNR order              [] Complete POST/POLST/MOST              [] Code Status Discussion             [] Discuss Goals of Care             [] Early ACP Decision-Making              [] Other (Specify)    Date Referral Received:10/12/23    Next Step:   [] ACP scheduled conversation  [x] Outreach again in one week               [] Email / Mail [address]  [] Email / Mail Advance Directive   [] Closing referral.  Routing closure to referring provider/staff and to ACP Specialist . [] Closure letter mailed to patient with invitation to contact ACP Specialist if / when ready. [] Other (Specify here):         [x] At this time, Healthcare Decision Maker Is: Advance Care Planning   Healthcare Decision Maker:    Primary Decision Maker: Star Older - Child - [phone]        [] Primary agent named in scanned advance directive. [x] Legal Next of Kin. [] Unable to determine legal decision maker at this time. Outreaches:         [x] 1st -  Date:  10/13/23               Intervention:  [x] Spoke with Patient   [] Left Voice mail [] Email / Mail    [] [employer]  [] Other [id number] : Outcomes: Outreach phone call to the patient. Spoke with patient stating this was not a good time to have a conversation. Pt requested to be called back on next week. Will attempt to follow up in one week. [] 2nd -  Date:                 Intervention:  [] Spoke with Patient  [] Left Voice mail [] Email / Mail    [] [employer]  [] Other [id number] :               Outcomes:                [] 3rd -  Date: .

## 2023-10-30 NOTE — OB PROVIDER H&P - ASSESSMENT
38 yo  at 33w5d w/ JAMES of 2019, Flu A pos,    -Admit  -    Dr. Franz and Dr. Ellison to be made aware. Cryotherapy Text: The wound bed was treated with cryotherapy after the biopsy was performed. 38 yo  at 33w5d w/ JAMES of 2019, Flu A pos, w/ episodes of dizziness for the past two months,     -Admit to 4B/4C  -CBC/ BMP in the AM  -Diet, regular  -Activity - ambulate as tolerated  -IVF - maintenance  -Nebulizer as needed for SOB  -Tylenol for fever and/or pain  -Tamiflu for flu prevention  -NST BID  -Cardiology consult  -Transthoracic echocardiogram    Dr. Franz and Dr. Ellison to be made aware. 40 yo  at 33w5d w/ JAMES of 2019, Flu A pos, w/ episodes of dizziness for the past two months, r/o syncope, for admission and prolonged monitoring,    -Admit to 4B/4C  -CBC/ BMP in the AM  -Diet, regular  -Activity - ambulate as tolerated  -IVF - maintenance  -Nebulizer if needed  -Tylenol for fever and/or pain  -Tamiflu for flu prevention  -NST BID  -Cardiology consult  -Transthoracic echocardiogram    Dr. Franz aware and Dr. Ellison to be made aware. 40 yo  at 33w5d w/ JAMSE of 2019, Flu A pos, w/ episodes of dizziness for the past two months, r/o syncope, for admission and prolonged monitoring,    -Admit to 4B/4C  -CBC/ BMP in the AM  -Diet, regular  -Activity - ambulate as tolerated  -IVF - maintenance  -Nebulizer if needed  -Tylenol for fever and/or pain  -Tamiflu   -NST BID  -Cardiology consult  -Transthoracic echocardiogram    Dr. Franz aware and Dr. Ellison to be made aware.

## 2024-01-25 ENCOUNTER — RX RENEWAL (OUTPATIENT)
Age: 44
End: 2024-01-25

## 2024-01-25 RX ORDER — NORETHINDRONE 0.35 MG/1
0.35 TABLET ORAL
Qty: 28 | Refills: 5 | Status: ACTIVE | COMMUNITY
Start: 2024-01-25 | End: 1900-01-01

## 2024-07-29 ENCOUNTER — RX RENEWAL (OUTPATIENT)
Age: 44
End: 2024-07-29

## 2024-08-20 NOTE — OB PROVIDER H&P - PSH
[FreeTextEntry1] : 51 y/o F here for management of migraines. Was previously on topiramate and fioricet but has not been on meds for at least 6 months and now noticing increase in frequency.   PLAN: - No need for neuroimaging as migraines are typical to previous migraines - Restart Topiramate 25mg qhs and increase by 25mg per week to reach 100mg qhs - Fioricet prn breakthrough migraines, advised to not use more than twice/week - HA diary  f/u 2 months 
No significant past surgical history

## 2024-09-20 DIAGNOSIS — Z78.9 OTHER SPECIFIED HEALTH STATUS: ICD-10-CM

## 2024-09-20 RX ORDER — NORGESTREL AND ETHINYL ESTRADIOL 0.3-0.03MG
0.3-3 KIT ORAL DAILY
Qty: 1 | Refills: 1 | Status: ACTIVE | COMMUNITY
Start: 2024-09-20 | End: 1900-01-01

## 2024-11-07 ENCOUNTER — APPOINTMENT (OUTPATIENT)
Dept: OBGYN | Facility: CLINIC | Age: 44
End: 2024-11-07

## 2024-11-07 VITALS
WEIGHT: 170 LBS | HEART RATE: 81 BPM | DIASTOLIC BLOOD PRESSURE: 66 MMHG | SYSTOLIC BLOOD PRESSURE: 96 MMHG | BODY MASS INDEX: 31.09 KG/M2

## 2024-11-07 DIAGNOSIS — Z01.419 ENCOUNTER FOR GYNECOLOGICAL EXAMINATION (GENERAL) (ROUTINE) W/OUT ABNORMAL FINDINGS: ICD-10-CM

## 2024-11-07 DIAGNOSIS — Z12.39 ENCOUNTER FOR OTHER SCREENING FOR MALIGNANT NEOPLASM OF BREAST: ICD-10-CM

## 2024-11-07 DIAGNOSIS — Z78.9 OTHER SPECIFIED HEALTH STATUS: ICD-10-CM

## 2024-11-07 LAB
BILIRUB UR QL STRIP: NORMAL
GLUCOSE UR-MCNC: NORMAL
HCG UR QL: 0.2 EU/DL
HCG UR QL: NEGATIVE
HGB UR QL STRIP.AUTO: NORMAL
KETONES UR-MCNC: NORMAL
LEUKOCYTE ESTERASE UR QL STRIP: NORMAL
NITRITE UR QL STRIP: NORMAL
PH UR STRIP: 6
PROT UR STRIP-MCNC: NORMAL
SP GR UR STRIP: 1.01

## 2024-11-07 PROCEDURE — 81025 URINE PREGNANCY TEST: CPT

## 2024-11-07 PROCEDURE — 82270 OCCULT BLOOD FECES: CPT

## 2024-11-07 PROCEDURE — 99396 PREV VISIT EST AGE 40-64: CPT

## 2024-11-07 PROCEDURE — 81003 URINALYSIS AUTO W/O SCOPE: CPT | Mod: QW

## 2024-11-07 PROCEDURE — 99459 PELVIC EXAMINATION: CPT

## 2024-11-08 ENCOUNTER — RX RENEWAL (OUTPATIENT)
Age: 44
End: 2024-11-08

## 2024-11-08 RX ORDER — NORGESTREL AND ETHINYL ESTRADIOL 0.3-0.03MG
0.3-3 KIT ORAL DAILY
Qty: 28 | Refills: 10 | Status: ACTIVE | COMMUNITY
Start: 2024-11-08 | End: 1900-01-01

## 2024-11-14 LAB
C TRACH RRNA SPEC QL NAA+PROBE: NOT DETECTED
DATE COLLECTED: NORMAL
HEMOCCULT SP1 STL QL: NEGATIVE
N GONORRHOEA RRNA SPEC QL NAA+PROBE: NOT DETECTED
SOURCE AMPLIFICATION: NORMAL

## 2024-12-13 ENCOUNTER — APPOINTMENT (OUTPATIENT)
Dept: OBGYN | Facility: CLINIC | Age: 44
End: 2024-12-13
Payer: COMMERCIAL

## 2024-12-13 VITALS
SYSTOLIC BLOOD PRESSURE: 102 MMHG | HEIGHT: 62 IN | WEIGHT: 172 LBS | DIASTOLIC BLOOD PRESSURE: 66 MMHG | HEART RATE: 73 BPM | BODY MASS INDEX: 31.65 KG/M2

## 2024-12-13 DIAGNOSIS — N93.8 OTHER SPECIFIED ABNORMAL UTERINE AND VAGINAL BLEEDING: ICD-10-CM

## 2024-12-13 LAB
BILIRUB UR QL STRIP: NORMAL
GLUCOSE UR-MCNC: NORMAL
HCG UR QL: 0.2 EU/DL
HCG UR QL: NEGATIVE
HGB UR QL STRIP.AUTO: NORMAL
KETONES UR-MCNC: NORMAL
LEUKOCYTE ESTERASE UR QL STRIP: NORMAL
NITRITE UR QL STRIP: NORMAL
PH UR STRIP: 6.5
PROT UR STRIP-MCNC: NORMAL
SP GR UR STRIP: 1.01

## 2024-12-13 PROCEDURE — 99459 PELVIC EXAMINATION: CPT

## 2024-12-13 PROCEDURE — 76830 TRANSVAGINAL US NON-OB: CPT

## 2024-12-13 PROCEDURE — 81025 URINE PREGNANCY TEST: CPT

## 2024-12-13 PROCEDURE — 81003 URINALYSIS AUTO W/O SCOPE: CPT | Mod: QW

## 2024-12-13 PROCEDURE — 99213 OFFICE O/P EST LOW 20 MIN: CPT

## 2024-12-13 RX ORDER — ESTRADIOL 1 MG/1
1 TABLET ORAL DAILY
Qty: 5 | Refills: 0 | Status: ACTIVE | COMMUNITY
Start: 2024-12-13 | End: 1900-01-01

## 2025-04-17 ENCOUNTER — RX RENEWAL (OUTPATIENT)
Age: 45
End: 2025-04-17